# Patient Record
Sex: FEMALE | Race: WHITE
[De-identification: names, ages, dates, MRNs, and addresses within clinical notes are randomized per-mention and may not be internally consistent; named-entity substitution may affect disease eponyms.]

---

## 2022-12-25 ENCOUNTER — HOSPITAL ENCOUNTER (EMERGENCY)
Dept: HOSPITAL 97 - ER | Age: 67
Discharge: HOME | End: 2022-12-25
Payer: COMMERCIAL

## 2022-12-25 VITALS — TEMPERATURE: 99 F

## 2022-12-25 VITALS — DIASTOLIC BLOOD PRESSURE: 106 MMHG | SYSTOLIC BLOOD PRESSURE: 169 MMHG | OXYGEN SATURATION: 97 %

## 2022-12-25 DIAGNOSIS — M79.621: Primary | ICD-10-CM

## 2022-12-25 PROCEDURE — 96372 THER/PROPH/DIAG INJ SC/IM: CPT

## 2022-12-25 PROCEDURE — 99283 EMERGENCY DEPT VISIT LOW MDM: CPT

## 2022-12-25 NOTE — RAD REPORT
EXAM DESCRIPTION:  RAD - Humerus Right - 12/25/2022 7:39 pm

 

CLINICAL HISTORY:  PAIN

 

COMPARISON:  No comparisons

 

FINDINGS:  Diffuse osteopenia is seen. No acute fracture or dislocation seen.

## 2022-12-25 NOTE — EDPHYS
Physician Documentation                                                                           

 [facility]                                                                 

Name: Natalie Guerrero                                                                                

Age: 67 yrs                                                                                       

Sex: Female                                                                                       

: 1955                                                                                   

MRN: [id number]                                                                                   

Arrival Date: 2022                                                                          

Time: 18:40                                                                                       

Account#: [id number]                                                                            

Bed 13                                                                                            

Private MD:                                                                                       

ED Physician Goyo Cardenas                                                                      

HPI:                                                                                              

                                                                                             

18:53 This 67 yrs old Female presents to ER via Ambulatory with complaints of Arm Injury,     kb  

      Shoulder Injury.                                                                            

18:53 The patient or guardian complains of pain, that is acute. The complaints affect the     kb  

      right upper arm. Context: The problem was sustained at home, resulted from a fall.          

      Onset: The symptoms/episode began/occurred 8 day(s) ago. Treatment prior to arrival         

      includes: no previous treatment. Modifying factors: The symptoms are alleviated by          

      nothing. the symptoms are aggravated by movement. Associated signs and symptoms:            

      Pertinent positives: pain. Severity of symptoms: At their worst the symptoms were           

      moderate, in the emergency department the symptoms are unchanged. The patient has not       

      experienced similar symptoms in the past. The patient has not recently seen a               

      physician. Pt reports she fell onto right arm on  and still has pain to right          

      humerus area. States she tried acupuncture and massage without relief .                     

                                                                                                  

Historical:                                                                                       

- Allergies:                                                                                      

18:42 No Known Allergies;                                                                     hb  

- PMHx:                                                                                           

18:42 Asthma; hiatal hernia; Hypertensive disorder;                                           hb  

                                                                                                  

- Immunization history:: Adult Immunizations up to date.                                          

- Social history:: Smoking status: Patient denies any tobacco usage or history of.                

                                                                                                  

                                                                                                  

ROS:                                                                                              

18:53 Constitutional: Negative for fever, chills, and weight loss.                            kb  

18:53 MS/extremity: Positive for pain, of the right upper arm.                                    

18:53 All other systems are negative.                                                             

                                                                                                  

Exam:                                                                                             

18:53 Constitutional:  This is a well developed, well nourished patient who is awake, alert,  kb  

      and in no acute distress. Head/Face:  Normocephalic, atraumatic. ENT:  Moist Mucous         

      membranes Cardiovascular:  Regular rate and rhythm with a normal S1 and S2.  No             

      gallops, murmurs, or rubs.  No pulse deficits. Respiratory:  Respirations even and          

      unlabored. No increased work of breathing. Talking in full sentences Abdomen/GI:  Soft,     

      non-tender. No distention Skin:  Warm, dry with normal turgor.  Normal color. Neuro:        

      Awake and alert, GCS 15, oriented to person, place, time, and situation. Moves all          

      extremities. Normal gait. Psych:  Awake, alert, with orientation to person, place and       

      time.  Behavior, mood, and affect are within normal limits.                                 

18:53 Musculoskeletal/extremity: Extremities: grossly normal except: noted in the right upper     

      arm: pain, ROM: intact in all extremities, Circulation is intact in all extremities.        

      Sensation intact.                                                                           

                                                                                                  

Vital Signs:                                                                                      

18:42  / 101; Pulse 107; Resp 16; Temp 99(TE); Pulse Ox 100% ; Weight 63.5 kg; Height 5 hb  

      ft. 6 in. (167.64 cm); Pain 6/10;                                                           

19:09  / 99; Pulse 101; Resp 16; Pulse Ox 99% on R/A; Pain 7/10;                        jb4 

19:45  / 106; Pulse 96; Resp 16; Pulse Ox 97% on R/A;                                   jb4 

18:42 Body Mass Index 22.60 (63.50 kg, 167.64 cm)                                             hb  

                                                                                                  

MDM:                                                                                              

18:41 Patient medically screened.                                                             kb  

18:53 Data reviewed: vital signs, nurses notes. Data interpreted: Pulse oximetry: on room air kb  

      is 100 %. Interpretation: normal.                                                           

19:51 Counseling: I had a detailed discussion with the patient and/or guardian regarding: the kb  

      historical points, exam findings, and any diagnostic results supporting the                 

      discharge/admit diagnosis, radiology results, the need for outpatient follow up, a          

      orthopedic surgeon, to return to the emergency department if symptoms worsen or persist     

      or if there are any questions or concerns that arise at home.                               

                                                                                                  

                                                                                             

18:43 Order name: Humerus Right XRAY                                                            

                                                                                             

18:55 Order name: Vital Signs; Complete Time: 19:10                                           kb  

                                                                                                  

Administered Medications:                                                                         

19:21 Drug: Ketorolac 30 mg Route: IM; Site: right gluteus;                                   jb4 

20:09 Follow up: Response: No adverse reaction; Pain is decreased                             jb4 

                                                                                                  

                                                                                                  

Disposition Summary:                                                                              

22 19:59                                                                                    

Discharge Ordered                                                                                 

      Location: Home                                                                            

      Condition: Stable                                                                       kb  

      Diagnosis                                                                                   

        - Pain in right upper arm                                                             kb  

      Followup:                                                                               kb  

        - With: Emergency Department                                                               

        - When: As needed                                                                          

        - Reason: Worsening of condition                                                           

      Followup:                                                                               kb  

        - With: Private Physician                                                                  

        - When: 2 - 3 days                                                                         

        - Reason: Recheck today's complaints, Continuance of care, Re-evaluation by your           

      physician                                                                                   

      Discharge Instructions:                                                                     

        - Discharge Summary Sheet                                                             kb  

        - Musculoskeletal Pain                                                                kb  

      Forms:                                                                                      

        - Medication Reconciliation Form                                                      kb  

        - Thank You Letter                                                                    kb  

        - Antibiotic Education                                                                kb  

        - Prescription Opioid Use                                                             kb  

      Prescriptions:                                                                              

        - Diclofenac Sodium 75 mg Oral tablet,delayed release (DR/EC)                              

            - take 1 tablet by ORAL route 2 times per day As needed; 30 tablet; Refills: 0,   kb  

      Product Selection Permitted                                                                 

        - orphenadrine citrate 100 mg Oral Tablet Sustained Release                                

            - take 1 tablet by ORAL route 2 times per day As needed; 20 tablet; Refills: 0,   kb  

      Product Selection Permitted                                                                 

Signatures:                                                                                       

Dispatcher [employer]                           EDMS                                                 

Delia Becerra, FNP-C                 FNDISHA-Carolyn Cox RN                     RN                                                      

Aric Guerra RN                       RN   jb4                                                  

                                                                                                  

**************************************************************************************************

## 2022-12-25 NOTE — ER
Nurse's Notes                                                                                     

 [facility]                                                                 

Name: Natalie Guerrero                                                                                

Age: 67 yrs                                                                                       

Sex: Female                                                                                       

: 1955                                                                                   

MRN: [id number]                                                                                   

Arrival Date: 2022                                                                          

Time: 18:40                                                                                       

Account#: [id number]                                                                            

Bed 13                                                                                            

Private MD:                                                                                       

Diagnosis: Pain in right upper arm                                                                

                                                                                                  

Presentation:                                                                                     

                                                                                             

18:42 Chief complaint: Right shoulder pain that radiates to right arm after fall from           

      standing 1 week ago. Coronavirus screen: At this time, the client does not indicate any     

      symptoms associated with coronavirus-19. Ebola Screen: No symptoms or risks identified      

      at this time. Initial Sepsis Screen: Does the patient meet any 2 criteria? No.              

      Patient's initial sepsis screen is negative. Does the patient have a suspected source       

      of infection? No. Patient's initial sepsis screen is negative. Risk Assessment: Do you      

      want to hurt yourself or someone else? Patient reports no desire to harm self or            

      others. Onset of symptoms was 2022.                                            

18:42 Method Of Arrival: Ambulatory                                                           hb  

18:42 Acuity: HADLEY 4                                                                           hb  

                                                                                                  

Historical:                                                                                       

- Allergies:                                                                                      

18:42 No Known Allergies;                                                                     hb  

- PMHx:                                                                                           

18:42 Asthma; hiatal hernia; Hypertensive disorder;                                           hb  

                                                                                                  

- Immunization history:: Adult Immunizations up to date.                                          

- Social history:: Smoking status: Patient denies any tobacco usage or history of.                

                                                                                                  

                                                                                                  

Screenin:15 [facility] ED Fall Risk Assessment (Adult) History of falling in the last 3 months,       jb4 

      including since admission No falls in past 3 months (0 pts) Confusion or Disorientation     

      No (0 pts) Intoxicated or Sedated No (0 pts) Impaired Gait No (0 pts) Mobility Assist       

      Device Used No (0 pt) Altered Elimination No (0 pt) Score/Fall Risk Level 0 - 2 = Low       

      Risk Oriented to surroundings, Maintained a safe environment. Abuse screen: Denies          

      threats or abuse. Nutritional screening: No deficits noted. Tuberculosis screening: No      

      symptoms or risk factors identified.                                                        

                                                                                                  

Assessment:                                                                                       

19:14 General: Appears in no apparent distress. comfortable, Behavior is calm, cooperative,   jb4 

      appropriate for age. Pain: Complains of pain in right arm Pain radiates to right            

      scapular area Pain currently is 7 out of 10 on a pain scale. Quality of pain is             

      described as throbbing. Neuro: Level of Consciousness is awake, alert, obeys commands,      

      Oriented to person, place, time, situation. Cardiovascular: Patient's skin is warm and      

      dry. Respiratory: Airway is patent Respiratory effort is even, unlabored, Respiratory       

      pattern is regular, symmetrical. GI: No signs and/or symptoms were reported involving       

      the gastrointestinal system. : No signs and/or symptoms were reported regarding the       

      genitourinary system. EENT: No signs and/or symptoms were reported regarding the EENT       

      system. Derm: Skin is intact, Skin is pink, warm \T\ dry. Musculoskeletal: Circulation,     

      motion, and sensation intact. Range of motion: intact in all extremities.                   

20:09 Reassessment: Patient appears in no apparent distress at this time. Patient and/or      jb4 

      family updated on plan of care and expected duration. Pain level reassessed. Patient is     

      alert, oriented x 3, equal unlabored respirations, skin warm/dry/pink.                      

                                                                                                  

Vital Signs:                                                                                      

18:42  / 101; Pulse 107; Resp 16; Temp 99(TE); Pulse Ox 100% ; Weight 63.5 kg; Height 5 hb  

      ft. 6 in. (167.64 cm); Pain 6/10;                                                           

19:09  / 99; Pulse 101; Resp 16; Pulse Ox 99% on R/A; Pain 7/10;                        jb4 

19:45  / 106; Pulse 96; Resp 16; Pulse Ox 97% on R/A;                                   jb4 

18:42 Body Mass Index 22.60 (63.50 kg, 167.64 cm)                                             hb  

                                                                                                  

ED Course:                                                                                        

18:40 Patient arrived in ED.                                                                  mr  

18:41 [facility]C [facility]                                                        kb  

18:41 Goyo Cardenas MD is Attending Physician.                                             kb  

18:44 Triage completed.                                                                       hb  

18:44 Arm band placed on.                                                                     hb  

19:04 Aric Guerra, RN is Primary Nurse.                                                     jb4 

19:15 Patient has correct armband on for positive identification. Placed in gown. Bed in low  jb4 

      position. Call light in reach. Side rails up X 1. Client placed on continuous cardiac       

      and pulse oximetry monitoring. NIBP monitoring applied.                                     

19:40 Humerus Right XRAY In Process Unspecified.                                              EDMS

20:09 No provider procedures requiring assistance completed. Patient did not have IV access   jb4 

      during this emergency room visit.                                                           

                                                                                                  

Administered Medications:                                                                         

19:21 Drug: Ketorolac 30 mg Route: IM; Site: right gluteus;                                   jb4 

20:09 Follow up: Response: No adverse reaction; Pain is decreased                             jb4 

                                                                                                  

                                                                                                  

Outcome:                                                                                          

19:59 Discharge ordered by MD. julien  

20:09 Discharged to home ambulatory.                                                          jb4 

20:09 Condition: stable                                                                           

20:09 Discharge instructions given to patient, Instructed on discharge instructions, follow       

      up and referral plans. no drinking with medication, no driving heavy equipment,             

      medication usage, Demonstrated understanding of instructions, follow-up care,               

      medications, Prescriptions given X 2.                                                       

20:10 Patient left the ED.                                                                    jb4 

                                                                                                  

Signatures:                                                                                       

Dispatcher [employer]                           EDMS                                                 

Delia Becerra, SHIMAPPadminiC                 FNP-Melissa Baca Heather, RN                     RN   Aric Servin RN                       RN   jb4                                                  

                                                                                                  

**************************************************************************************************

## 2022-12-25 NOTE — XMS REPORT
Continuity of Care Document

                          Created on:2022



Patient:BILL MICHEL

Sex:Female

:1955

External Reference #:[id number]





Demographics







                          Address                   [address] [place] [address]

 

                          Home Phone                [phone]

 

                          Mobile Phone              [phone]

 

                          Email Address             [email or web address]

 

                          Preferred Language        English

 

                          Marital Status            Unknown

 

                          Quaker Affiliation     Unknown

 

                          Race                      Unknown

 

                          Additional Race(s)         or [place] Na

tive



                                                    Unavailable

 

                          Ethnic Group              Unknown









Author







                          Organization              [facility]

t

 

                          Address                   [address]



                                                    [place] [address]

 

                          Phone                     [phone]









Support







                Name            Relationship    Address         Phone

 

                Debora Michel Extended family member [address] [facility]   [phone]

[phone]



                                                [place] [address] 

 

                GUY MICHEL               [address] [facility]   [phone]



                                                [place] [address] 

 

                DEBORA MICHEL               -               [phone]

[phone]



                                                [place] [address] 

 

                ROBERTO MICHEL                  Unavailable     [phone]

 

                NOT OBTAINED    Unavailable     Unavailable     Unavailable

 

                MICHELWILMA VARMA                 [address] [facility]   [phone]



                                                [place] [address] 









Care Team Providers







                    Name                Role                Phone

 

                    [facility] [phone]

 

                    TAY GANNON Attending Clinician Unavailable

 

                    GERARD BELL Attending Clinician Unavailable

 

                    MAUREEN ACOSTA Attending Clinician Unavailable

 

                    MAUREEN ACOSTA Attending Clinician Unavailable

 

                    Tay Gannon Attending Clinician [phone]

[phone]

 

                    TAY GANNON Attending Clinician Unavailable

 

                    Paula Desir Attending Clinician [phone]

 

                    PAULA DESIR Attending Clinician Unavailable

 

                    GERARD BELL Attending Clinician Unavailable

 

                    Gerard Bell Attending Clinician [phone]

 

                    Maureen Acosta Attending Clinician [phone]

 

                    Toney Martins Attending Clinician [phone]

 

                    TONEY MARTINS Attending Clinician Unavailable

 

                    PIA FOY Attending Clinician Unavailable

 

                    Pia Foy Attending Clinician [phone]

 

                    Clover Nayak Attending Clinician [phone]

 

                    CLOVER NAYAK Attending Clinician Unavailable

 

                    PIA FOY Admitting Clinician Unavailable

 

                    Pia Foy Admitting Clinician [phone]









Payers







           Payer Name Policy Type Policy Number Effective Date Expiration Date S

chika

 

           [employer] PPO AND            [id number]  2008 00:00:00 



           OUT OF STATE                       00:00:00              







Problems







       Condition Condition Condition Status Onset  Resolution Last   Treating Co

mments 

Source



       Name   Details Category        Date   Date   Treatment Clinician        



                                                 Date                 

 

       CHEST   CHEST Diagnosis Active 2022-0        2022-08-15               Mem

oria



       PAIN,  PAIN,                8-10          08:53:00               l



       UNSPECIFIE UNSPECIFIE               00:00:                             Janak becerra



       D,     D,                   00                                 



       SHORTNESS SHORTNESS                                                  



       OF BR  OF BR                                                   



              Active                                                  



              08/10/2022                                                  



                                                                    



              [facility]                                                  



              [facility]                                                  

 

       4 WK FU   4 WK FU Diagnosis Active 2022-0        2022-10-10              

 [facility]



              Active               8-10          12:03:00               l



              08/10/2022               00:00:                             Pedro Pablo dasilva



              [address]                                                  

 

       REF BY   REF BY Diagnosis Active 2022-0        2022-08-10             

  Karen [address]O DR.                  6-15          15:08:00               l



       DAMIR GALO               00:00:                             [facility]



       CARDIAC NAYAK/                00                                 



       CLEARAN CARDIAC                                                  



              CLEARAN                                                  



              Active                                                  



              06/15/2022                                                  



              [facility]                                                  

 

       INTERSTITI  INTERSTIT Diagnosis Active 2022          

     Karen



       AL     IAL                  5-18          14:09:00               l



       PULMONARY PULMONARY               00:00:                             Herm

nydia



       DISEASE DISEASE               00                                 



              Active                                                  



              2022                                                  



              [facility]                                                  

 

       GERD K44.9  GERD  Diagnosis Active 2022              

 [facility]



       K20.91 K44.9                4-04          07:38:00               l



              K20.91               00:00:                             [place]



              Active               00                                 



              2022                                                  



               [facility]                                                  

 

       UNK     UNK   Diagnosis Active 2022               Mem

oria



              Active                         08:07:00               l



              2022               00:00:                             Pedro Pablo dasilva



              [address]                                                  

 

       ESOPHAGEAL  ESOPHAGEA Diagnosis Active 2022-0        2022          

     [facility]



       STRICTURE L                    1-06          14:29:00               l



              STRICTURE               00:00:                             Unruly



              Active               00                                 



              2022                                                  



              [facility]                                                  

 

       PHONE   PHONE Diagnosis Active 2022               Mem

oria



       VISIT  VISIT                          13:52:00               l



              Active               00:00:                             Unruly



              2022               00                                 



               [facility]                                                  

 

       ULCERATIVE  ULCERATIV Diagnosis Active 2021          

     [facility]



       ESOPHAGITI E                              12:33:00               l



       S, HIATAL ESOPHAGITI               00:00:                             Her

crow



       HERNIA, H S, HIATAL               00                                 



              HERNIA, H                                                  



              Active                                                  



              10/22/2021                                                  



                                                                    



              [facility]                                                  



              [facility]                                                  

 

       ULCER OF  ULCER OF Diagnosis Active 2021-0        2021-10-04             

  [facility]



       ESOPHAGUS ESOPHAGUS                         16:35:00               l



       WITHOUT WITHOUT               00:00:                             Unruly



       BLEEDING, BLEEDING,               00                                 



       ESS    ESS Active                                                  



              2021                                                  



                                                                    



              [facility]                                                  



              [facility]                                                  

 

       ABDOMINAL  ABDOMINAL Diagnosis Active 2021           

    [facility]



       PAIN   PAIN                           11:34:00               l



       DISPHAGIA DISPHAGIA               00:00:                             Herm

nydia



              Active               00                                 



              2021                                                  



                                                                    



              [facility]                                                  



              [facility]                                                  

 

       GERD    GERD  Diagnosis Active 2021               Mem

oria



              Active                         11:45:00               l



              2021               00:00:                             Pedro Pablo dasilva



               [facility]               00                                 



              [facility]                                                  

 

       SENT BY  SENT BY Diagnosis Active 2021               

[facility]



       PCP FOR PCP FOR                         16:42:00               l



       BLOODY BLOODY               00:00:                             [place]



       STOOL  STOOL                00                                 



              Active                                                  



              2021                                                  



               [facility]                                                  



              [facility]                                                  

 

       UPPER GO  UPPER GO Diagnosis Active 2021             

  [facility]



       BLEED; BLEED;                         11:32:00               l



       ANEMIA; ANEMIA;               00:00:                             [place]



       PEPTIC PEPTIC               00                                 



       ULCER DIS ULCER DIS                                                  



              Active                                                  



              2021                                                  



                                                                    



              [facility]                                                  



              [facility]                                                  

 

       R92.2   R92.2 Diagnosis Active 2021               Mem

oria



       INCONCLUSI INCONCLUSI                         11:59:00               

l



       VE     VE                   00:00:                             [facility]



       MAMMOGRAM MAMMOGRAM               00                                 



              Active                                                  



              2021                                                  



                                                                    



              [facility]                                                  



              [facility]                                                  

 

       Z12.31  Z12.31 Diagnosis Active 2021               Me

moria



       ROUTINE, ROUTINE,                         10:52:00               l



       M81.0  M81.0                00:00:                             [place]



       OSTEOPOROS OSTEOPOROS               00                                 



       IS     IS Active                                                  



              2021                                                  



               [facility]                                                  

 

       Z12.31  Z12.31 Diagnosis Active 2021               Me

[place]



       ROUTINE, ROUTINE,                         10:25:00               l



       AGE-RELATE AGE-RELATE               00:00:                             He

hernan



       D      D                    00                                 



       OSTEOPOROS OSTEOPOROS                                                  



       I      I Active                                                  



              2021                                                  



                                                                    



              [facility]                                                  



              [facility]                                                  

 

       R05 COUGH  R05 COUGH Diagnosis Active 2018-1        2018-10-02           

    [facility]



               Active               0          10:19:00               l



              10/01/2018               00:00:                             Pedro Pablo dasilva



              [facility]               00                                 



              [facility]                                                  

 

       R92.8   R92.8 Diagnosis Active 2017-1        2017-10-10               Mem

oria



       OTHER  OTHER                0          13:58:00               l



       ABNORMAL ABNORMAL               00:00:                             Pedro Pablo dasilva



       AND    AND                  00                                 



       INCONCLUSI INCONCLUSI                                                  



       VE F   VE F                                                    



              Active                                                  



              10/06/2017                                                  



              [facility]                                                  

 

       N63     N63   Diagnosis Active 2017               Mem

oria



              Active                         10:11:00               l



              2017               00:00:                             Pedro Pablo dasilva



                                 00                                 



              [facility]                                                  



              [facility]                                                  

 

       LUMP IN  LUMP IN Diagnosis Active 2017               

[facility]



       BREAST, BREAST,                         15:38:00               l



       OTHER  OTHER                00:00:                             [facility]



       OSTEOPOROS OSTEOPOROS               00                                 



       IS     IS Active                                                  



              2017                                                  



               [facility]                                                  

 

       Diaphragma  Diaphragm Problem                      2019            

   [facility]



       tic hernia atic                               14:11:30               l



       without hernia                                                  Unruly



       obstructio without                                                  



       n or   obstructio                                                  



       gangrene n or                                                    



              gangrene                                                  



              2019                                                  



              [facility]                                                  

 

       Anxiety  Anxiety Problem Active               2022               Me

moria



       (finding) (finding)                             00:07:16               l



              Active                                                  [facility]



              Problem                                                  



              2022                                                  



               Medical                                                  



              [facility]                                                  

 

       Asthma  Asthma Problem Active               2022               [facility]

jairo



       (disorder) (disorder)                             00:07:16               

l



              Active                                                  [facility]



              Problem                                                  



              2022                                                  



               Medical                                                  



              [facility]                                                  

 

       Gastroesop  Gastroeso Problem Active               2022            

   [facility]



       hageal phageal                             00:07:16               l



       reflux reflux                                                  Unruly



       disease disease                                                  



       (disorder) (disorder)                                                  



               Active                                                  



              Problem                                                  



              2022                                                  



               Medical                                                  



              [facility]                                                  



              [facility]                                                  

 

       Hypertensi  Hypertens Problem Active               2022            

   [facility]



       ve     derik                                00:07:16               l



       disorder, disorder,                                                  Herm

nydia



       systemic systemic                                                  



       arterial arterial                                                  



       (disorder) (disorder)                                                  



              Active                                                  



              Problem                                                  



              2022                                                  



               Medical                                                  



              [facility]                                                  

 

       Ulcerative  Ulcerativ Problem Active               2022            

   [facility]



       esophagiti e                                  00:07:16               l



       s      esophagiti                                                  Pedro Pablo

n



       (disorder) s                                                       



              (disorder)                                                  



              Active                                                  



              Problem                                                  



              2022                                                  



               Medical                                                  



              [facility]                                                  

 

       Anemia  Anemia Problem Active               2022               [facility]

[place]



       (disorder) (disorder)                             00:07:16               

l



               Active                                                  [facility]



              Problem                                                  



              2022                                                  



               Medical                                                  



              [facility]                                                  

 

       ULCER OF  ULCER OF Diagnosis Active               2021             

  [facility]



       ESOPHAGUS ESOPHAGUS                             12:33:00               l



       WITHOUT WITHOUT                                                  [place]



       BLEEDING BLEEDING                                                  



              Active [facility]                                                  

 

       ESSENTIAL  ESSENTIAL Diagnosis Active               2021           

    [facility]



       (PRIMARY) (PRIMARY)                             12:33:00               l



       HYPERTENSI HYPERTENSI                                                  He

rmann



       ON     ON Active                                                  



              [facility]                                                  

 

       GASTROINTE  GASTROINT Diagnosis Active               2021          

     [facility]



       STINAL ESTINAL                             11:32:00               l



       HEMORRHAGE HEMORRHAGE                                                  He

rmann



       ,      ,                                                       



       UNSPECIFIE UNSPECIFIE                                                  



       D      D Active                                                  



              [facility]                                                  

 

       ANEMIA,  ANEMIA, Diagnosis Active               2021               

[facility]



       UNSPECIFIE UNSPECIFIE                             11:32:00               

l



       D      D Active                                                  [place]



              [facility]                                                  

 

       PEPTIC  PEPTIC Diagnosis Active               2021               Me

moria



       [facility], SITE UL, SITE                             11:32:00               l



       UNSP, UNSP UNSP, UNSP                                                  He

rmann



       AS AC OR AS AC OR                                                  



       CHR    CHR Active                                                  



              [facility]                                                  

 

       GASTRO-ESO  GASTRO-ES Diagnosis Active               2021          

     [facility]



       PHAGEAL OPHAGEAL                             11:45:00               l



       REFLUX REFLUX                                                  [facility]



       DISEASE DISEASE                                                  



       WITHOUT WITHOUT                                                  



              Active  [facility]                                                  

 

       AGE-RELATE  AGE-RELAT Diagnosis Active               2021          

     [facility]



       D      ED                                 10:52:00               l



       OSTEOPOROS OSTEOPOROS                                                  He

rmann



       IS W/O IS W/O                                                  



       CURRENT CURRENT                                                  



       PAT    PAT Active                                                  



               [facility]                                                  

 

       ENCNTR   ENCNTR Diagnosis Active               2021               M

emoria



       SCREEN SCREEN                             10:52:00               l



       MAMMOGRAM MAMMOGRAM                                                  [facility]erm

nydia



       FOR    FOR                                                     



       MALIGNANT MALIGNANT                                                  



       NE     NE Active                                                  



              [facility]                                                  



              [facility]                                                  

 

       INCONCLUSI  INCONCLUS Diagnosis Active               2021          

     [facility]



       VE     DERIK                                11:59:00               l



       MAMMOGRAM MAMMOGRAM                                                  [facility]

nydia



              Active                                                    



              [facility]                                                  



              [facility]                                                  

 

       DIAPHRAGMA  DIAPHRAGM Diagnosis Active               2021          

     [facility]



       TIC HERNIA ATIC                               12:33:00               l



       WITHOUT HERNIA                                                  [facility]



       OBSTRUCTIO WITHOUT                                                  



       N      OBSTRUCTIO                                                  



              N Active                                                  



               [facility]                                                  



              [facility]                                                  

 

       ENCNTR FOR  ENCNTR Diagnosis Active               2022-08-10             

  [facility]



       GENERAL FOR                                15:08:00               l



       ADULT  GENERAL                                                  [place]



       MEDICAL ADULT                                                   



       EXAM W/ MEDICAL                                                  



              EXAM W/                                                  



              Active  [facility]                                                  

 

       INTERSTITI        Diagnosis Active               2022              

 [facility]



       AL     INTERSTITI                             14:09:00               l



       PULMONARY AL                                                      [facility]



       DISEASE, PULMONARY                                                  



       UNSPECIF DISEASE,                                                  



              UNSPECIF                                                  



              Active                                                    



              [facility]                                                  



              [facility]                                                  

 

       UNSPECIFIE  UNSPECIFI Diagnosis Active               2022          

     [facility]



       D ASTHMA, ED ASTHMA,                             14:09:00               l



       UNCOMPLICA UNCOMPLICA                                                  He

rmann



       MARIA LUZ    MARIA LUZ                                                     



              Active [facility]                                                  

 

       UNSPECIFIE  UNSPECIFI Diagnosis Active               2017          

     [facility]



       D LUMP IN ED LUMP IN                             15:38:00               l



       BREAST BREAST                                                  [facility]



              Active                                                    



              [facility]                                                  



              [facility]                                                  

 

       OTHER   OTHER Diagnosis Active               2017               Mem

oria



       OSTEOPOROS OSTEOPOROS                             15:38:00               

l



       IS WITHOUT IS WITHOUT                                                  He

rmann



       CURRENT CURRENT                                                  



       PATHO  PATHO                                                   



              Active                                                    



              [facility]                                                  



              [facility]                                                  

 

       OTH ABN  OTH ABN Diagnosis Active               2017-10-10               

[facility]



       AND    AND                                13:58:00               l



       INCONCLUSI INCONCLUSI                                                  He

rmann



       VE     VE                                                      



       FINDINGS FINDINGS                                                  



       ON DX  ON DX                                                   



              Active [facility]                                                  

 

       COUGH   COUGH Diagnosis Active               2018-10-02               Mem

oria



              Active                               10:19:00               l



              [facility]                                                  [facility]                                                  

 

       CHEST    CHEST Diagnosis Active               2022-08-15               Me

moria



       PAIN,  PAIN,                              08:53:00               l



       UNSPECIFIE UNSPECIFIE                                                  He

rmann



       D      D Active                                                  



              [facility]                                                  

 

       SHORTNESS  SHORTNESS Diagnosis Active               2022-08-15           

    [facility]



       OF BREATH OF BREATH                             08:53:00               l



              Active                                                    Pedro Pablo

n



              [facility]                                                  



              [facility]                                                  







History of Past Illness







       Condition Condition Condition Status Onset  Resolution Last   Treating Co

mments 

Source



       Name   Details Category        Date   Date   Treatment Clinician        



                                                 Date                 

 

       Encounter  Encounter Problem        2022         

      [facility]



       for    for                  8-10   00:43:14 00:43:14               l



       preprocedu preprocedu               20:28:                             He

rmann



       ral    ral                  00                                 



       cardiovasc cardiovasc                                                  



       ular   ular                                                    



       examinatio examinatio                                                  



       n      n                                                       



              08/10/2022                                                  



              2022                                                  



              [facility]                                                  

 

       Shortness  Shortness Problem        2022         

      [facility]



       of breath of breath               8-10   00:43:14 00:43:14               

l



              08/10/2022               20:28:                             Pedro Pablo dasilva



              2022               00                                 



              [facility]                                                  

 

       Chest   Chest Problem        2022               M

emoripaula



       pain,  pain,                8-10   00:43:14 00:43:14               l



       unspecifie unspecifie               20:28:                             He

rmann



       d      d                    00                                 



              08/10/2022                                                  



              2022                                                  



              [facility]                                                  

 

       Gastro-eso  Gastro-es Problem        2022        

       [facility]



       phageal ophageal                  00:08:27 00:08:27               l



       reflux reflux               18:48:                             [facility]



       disease disease               00                                 



       without without                                                  



       esophagiti esophagiti                                                  



       s      s                                                       



              2022                                                  



               [facility]                                                  

 

       Cough   Cough Problem        -2019               M

emoria



              10/06/2018               0-06   14:11:30 14:11:30               l



              2019               03:52:                             Pedro Pablo dasilva



              [address]                                                  







Allergies, Adverse Reactions, Alerts

This patient has no known allergies or adverse reactions.



Social History







           Social Habit Start Date Stop Date  Quantity   Comments   Source

 

           Social History 2022                       [facility]

rebel



                      18:25:55   18:25:55                         

 

           Sex Assigned At 1955                       [facility]



           Birth      00:00:00   00:00:00                         









                Smoking Status  Start Date      Stop Date       Source

 

                Tobacco smoking consumption unknown                             

    [facility]







Medications







       Ordered Filled Start  Stop   Current Ordering Indication Dosage Frequency

 Signature

                    Comments            Components          Source



     Medication Medication Date Date Medication? Clinician                (SIG) 

          



     Name Name                                                   

 

     Hydralazine            No                       10 mg,           [facility]

jairo



                                              Route:           l



               19:11:                               IVP,           [facility]



               00                                 Q20Min,           



                                                  Dosing           



                                                  Weight           



                                                  65.2, kg,           



                                                  PRN            



                                                  Elevated           



                                                  BP, Start           



                                                  date:           



                                                  22           



                                                  13:11:00           



                                                  CST,           



                                                  Duration:           



                                                  2 doses or           



                                                  times,           



                                                  Stop date:           



                                                  Limited #           



                                                  of times           

 

     Labetalol      -0      No                       10 mg,           [facility]

a



               2-25                               Route:           l



               19:11:                               IVP,           [place]



               00                                 Q5Min,           



                                                  Dosing           



                                                  Weight           



                                                  65.2, kg,           



                                                  PRN            



                                                  Elevated           



                                                  BP, Start           



                                                  date:           



                                                  22           



                                                  13:11:00           



                                                  CST,           



                                                  Duration:           



                                                  5 doses or           



                                                  times,           



                                                  Stop date:           



                                                  Limited #           



                                                  of times           

 

     Fentanyl      -0      No                       25             [facility]



               2-25                               microgram,           l



               19:11:                               Route:           [facility]



               00                                 IVP,           



                                                  Q15Min,           



                                                  Dosing           



                                                  Weight           



                                                  65.2, kg,           



                                                  PRN Pain           



                                                  Score 4-6,           



                                                  Priority:           



                                                  Routine,           



                                                  Start           



                                                  date:           



                                                  22           



                                                  13:11:00           



                                                  CST,           



                                                  Duration:           



                                                  4 doses or           



                                                  times,           



                                                  Stop date:           



                                                  Limited #           



                                                  of times           

 

     Hydromorpho      -0      No                       0.5 mg,           Mem

oria



     ne        2-25                               Route:           l



               19:11:                               IVP,           [place]



               00                                 Q20Min,           



                                                  Dosing           



                                                  Weight           



                                                  65.2, kg,           



                                                  PRN Pain           



                                                  Score           



                                                  7-10,           



                                                  Start           



                                                  date:           



                                                  22           



                                                  13:11:00           



                                                  CST,           



                                                  Duration:           



                                                  4 doses or           



                                                  times,           



                                                  Stop date:           



                                                  Limited #           



                                                  of times           

 

     Flumazenil      -0      No                       0.2 mg,           [facility]

jairo



                                              Route:           l



               19:11:                               IVP, PRN,           [place]



               00                                 Dosing           



                                                  Weight           



                                                  65.2, kg,           



                                                  PRN            



                                                  Benzodiaze           



                                                  pine           



                                                  Reversal,           



                                                  Initial           



                                                  dose,           



                                                  Start           



                                                  date:           



                                                  22           



                                                  13:11:00           



                                                  CST,           



                                                  Duration:           



                                                  30 day,           



                                                  Stop date:           



                                                  22           



                                                  14:10:00           



                                                  CDT            

 

     Naloxone      -0      No                       0.4 mg,           [facility]

a



                                              Route:           l



               19:11:                               IVP,           [place]



               00                                 Q2MIN,           



                                                  Dosing           



                                                  Weight           



                                                  65.2, kg,           



                                                  PRN            



                                                  Narcotic           



                                                  Reversal,           



                                                  Start           



                                                  date:           



                                                  22           



                                                  13:11:00           



                                                  CST,           



                                                  Duration:           



                                                  8 doses or           



                                                  times,           



                                                  Stop date:           



                                                  Limited #           



                                                  of times           

 

     Albuterol      -0      No                       2.49 mg,           [facility]

jairo



     0.83 MG/ML                                     Route:           l



     Inhalant      19:11:                               NEB,           [place]



     Solution      00                                 Q20Min,           



                                                  Dosing           



                                                  Weight           



                                                  65.2, kg,           



                                                  PRN            



                                                  Wheezing,           



                                                  Start           



                                                  date:           



                                                  22           



                                                  13:11:00           



                                                  CST,           



                                                  Duration:           



                                                  30 day,           



                                                  Stop date:           



                                                  22           



                                                  14:10:00           



                                                  CDT            

 

     Ondansetron            No                       4 mg,           [facility]

ia



                                              Route:           l



               19:11:                               IVP, ONCE,                                            Dosing           



                                                  Weight           



                                                  65.2, kg,           



                                                  PRN Nausea           



                                                  &              



                                                  Vomiting,           



                                                  Start           



                                                  date:           



                                                  22           



                                                  13:11:00           



                                                  CST            

 

     Promethazin            No                       6.25 mg,           Me

moria



     e                                        Route:           l



               19:11:                               IVPB,                                            ONCE,           



                                                  Dosing           



                                                  Weight           



                                                  65.2, kg,           



                                                  PRN Nausea           



                                                  &              



                                                  Vomiting,           



                                                  Start           



                                                  date:           



                                                  22           



                                                  13:11:00           



                                                  CST            

 

     Simethicone            No                       40 mg,           [facility]

[facility]



                                              Route: PO,           l



               19:11:                               Drug form:           [facility]



                                                DROP,           



                                                  ONCE,           



                                                  Dosing           



                                                  Weight           



                                                  65.2, kg,           



                                                  PRN Gas,           



                                                  Start           



                                                  date:           



                                                  22           



                                                  13:11:00           



                                                  CST            

 

     Benzocaine            No                       1 lozenge,           M

[facility]



     15 MG /                                     Route:           l



     Menthol 3.6      19:11:                               MUCOUS           [facility]

[facility]



     MG Lozenge      00                                 [facility], Drug           



     [Cepacol                                         Form: CELE,           



     Sore Throat                                         Dosing           



     Pain Relief                                         Weight           



     15/3.6]                                         65.2, kg,           



                                                  Q2H, PRN           



                                                  Sore           



                                                  Throat,           



                                                  Start           



                                                  date:           



                                                  22           



                                                  13:11:00           



                                                  CST,           



                                                  Duration:           



                                                  30 day,           



                                                  Stop date:           



                                                  22           



                                                  13:10:00           



                                                  CDT            

 

     lidocaine            No                       Route: IV,           Me

[facility]



     (ANES)      -                               Drug form:           l



               18:49:                               INJ, ONCE,                                            Stop date:           



                                                  22           



                                                  12:49:00           



                                                  CST            

 

     propofol            No                       Route: IV,           [facility]

[facility]



     (ANES)      2-25                               Drug form:           l



               18:49:                               INJ, ONCE,                                            Stop date:           



                                                  22           



                                                  12:49:00           



                                                  CST            

 

     succinylcho            No                       Route: IV,           

[facility]



     line (ANES)      2-                               Drug form:           l



               18:49:                               INJ, ONCE,                                            Stop date:           



                                                  22           



                                                  12:49:00           



                                                  CST            

 

     fentaNYL            No                       Route: IV,           Mem

[facility]



     (ANES)      2-25                               Drug form:           l



               18:49:                               INJ, ONCE,                                            Stop date:           



                                                  22           



                                                  12:49:00           



                                                  CST            

 

     ondansetron            No                       Route: IV,           

[facility]



     (ANES)      -                               Drug form:           l



               18:49:                               INJ, ONCE,                                            Stop date:           



                                                  22           



                                                  12:49:00           



                                                  CST            

 

     dexamethaso            No                       Route: IV,           

[facility]



     ne (ANES)                                     Drug form:           l



               18:49:                               INJ, ONCE,                                            Stop date:           



                                                  22           



                                                  12:49:00           



                                                  CST            

 

     ePHEDrine            No                       Route: IV,           Me

moria



     (ANES)                                     Drug form:           l



               18:49:                               INJ, ONCE,                                            Stop date:           



                                                  22           



                                                  12:49:00           



                                                  CST            

 

     phenylephri            No                       Route: IV,           

[facility]



     ne (ANES)                                     Drug form:           l



               18:46:                               INJ, ONCE,                                            Stop date:           



                                                  22           



                                                  12:46:00           



                                                  CST            

 

     Isolyte S            No                       Route: IV,           Me

[facility]



     PH 7.4                                     Total           l



     (ANES) 1000      17:55:                               Volume:           Her

crow



     mL        00                                 1,000,           



                                                  Start           



                                                  date:           



                                                  22           



                                                  11:55:00           



                                                  CST, Stop           



                                                  date:           



                                                  22           



                                                  12:55:00           



                                                  CST            

 

     Labetalol      2021      No                       10 mg,           [facility]

a



               2-                               Route: IV,           l



               20:47:                               ONCE,                                            Dosing           



                                                  Weight           



                                                  66.818,           



                                                  kg, Start           



                                                  date:           



                                                  21           



                                                  14:47:00           



                                                  CST, Stop           



                                                  date:           



                                                  21           



                                                  14:47:00           



                                                  CST            

 

     Fentanyl      2021      No                       Notes:           [facility]



               2-06                               (Same as:           l



               20:44:                               Sublimaze)                                            Preservati           



                                                  ve free.           

 

     Hydromorpho      2021      No                       Notes:           [facility]

[facility]



     ne        2-06                               Same as           l



               20:44:                               Dilaudid                                                           

 

     Flumazenil      2021      No                       Notes:           [facility]

ia



               2-06                               (Same as:           l



               20:44:                               Romazicon)                                                           

 

     Naloxone      2021      No                       Notes:           [facility]



               2-06                               Same as           l



               20:44:                               Narcan                                                           

 

     Meperidine      2021      No                       Notes:           [facility]

ia



               2-06                               (Same as:           l



               20:44:                               Demerol)                                            "Use           



                                                  Precaution           



                                                  in             



                                                  Elderly,           



                                                  Seizure           



                                                  disorders,           



                                                  and Renal           



                                                  impairment           



                                                  "              

 

     Ondansetron      2021      No                       Notes:           [facility]

[facility]



                                              (Same as:           l



               20:44:                               Zofran)           [facility]



               00                                 ***            



                                                  MEDICATION           



                                                  WASTE ***           



                                                  Product           



                                                  Size: 4 mg           



                                                  Product           



                                                  Wasted:           



                                                  ___ mg           

 

     Acetaminoph      2021      No                       Notes: Max           

[facility]



     en                                       acetaminop           l



               20:44:                               hen 4000           [facility]



               00                                 mg/day (4           



                                                  gm/day).           



                                                  (Same as:           



                                                  Tylenol           



                                                  Extra           



                                                  Strength)           

 

     Sucralfate            Yes                      1 gm = 10           Me

[facility]



     100 MG/ML      9-29                               ml, PO,           l



     Oral      21:58:                               QID-Before           [facility]



     Suspension      00                                 Meals, #           



     [Carafate]                                         400 ml, 0           



                                                  Refill(s)           

 

     Fluoxetine            Yes                      40 mg = 1           Me

[facility]



     40 MG Oral      9-22                               cap, PO,           l



     Capsule      16:29:                               Daily, #           [facility]

n



     [Prozac]      00                                 30 cap, 0           



                                                  Refill(s)           

 

     Amphetamine            No                       30 mg = 1           M

[facility]



     aspartate      9-20                               tab, PO,           l



     7.5 MG /      18:00:                               BID, 0           [facility]



     Amphetamine      00                                 Refill(s)           



     Sulfate 7.5                                                        



     MG /                                                        



     Dextroamphe                                                        



     tamine                                                        



     saccharate                                                        



     7.5 MG /                                                        



     Dextroamphe                                                        



     tamine                                                        



     Sulfate 7.5                                                        



     MG Oral                                                        



     Tablet                                                        



     [Adderall]                                                        

 

     Albuterol            Yes                      3 mL, NEB,           Me

moria



     0.833 MG/ML      9-20                               QID, 0           l



     /         18:00:                               Refill(s)           [facility]



     Ipratropium      00                                                



     Bromide                                                        



     0.167 MG/ML                                                        



     Inhalant                                                        



     Solution                                                        

 

     Lorazepam 1            No                       1 mg = 1           Me

moria



     MG Oral      9-20                               tab, PO,           l



     Tablet      17:59:                               TID, 0           [facility]



               00                                 Refill(s)           

 

     Sucralfate            Yes                      1 gm = 1           Mem

oria



     1000 MG      7-16                               tab, PO,           l



     Oral Tablet      00:09:                               QID-Before           

[facility]



     [Carafate]      00                                 Meals, #           



                                                  120 tab, 0           



                                                  Refill(s),           



                                                  Pharmacy:           



                                                  [facility]           



                                                  149,           



                                                  167.64,           



                                                  cm,            



                                                  21           



                                                  22:09:00           



                                                  CDT,           



                                                  Height,           



                                                  71.002,           



                                                  kg,            



                                                  21           



                                                  22:09:00           



                                                  CDT,           



                                                  Weight           

 

     pantoprazol            Yes                      40 mg = 1           M

emoria



     e 40 MG      7-16                               tab, PO,           l



     Enteric      00:08:                               BID, # 42           [facility]

nn



     Coated      00                                 tab, 0           



     Tablet                                         Refill(s),           



     [Protonix]                                         Pharmacy:           



                                                  [facility]           



                                                  149,           



                                                  167.64,           



                                                  cm,            



                                                  21           



                                                  22:09:00           



                                                  CDT,           



                                                  Height,           



                                                  71.002,           



                                                  kg,            



                                                  21           



                                                  22:09:00           



                                                  CDT,           



                                                  Weight           

 

     Calcium            No                       Notes:           [facility]



     Gluconate      7-15                               Contains:           l



               14:00:                               calcium           [facility]



                                                gluconate           



                                                  20mg/mL           



                                                  NaCl 0.67%           



                                                  50mL           



                                                  WASTE: F/P           



                                                  - Sink; E           



                                                  -              



                                                  Municipal           



                                                  Trash Bin           

 

     1 ML            Yes                      60 mg = 1           [facility]



     denosumab      7-14                               mL, SUB-Q,           l



     60 MG/ML      03:37:                               ONCE,           [facility]



     Prefilled      00                                 Repeat           



     Syringe                                         every 6           



     [Prolia]                                         months, #           



                                                  1 mL, 0           



                                                  Refill(s)           

 

     Amphetamine            Yes                      30 mg = 1           M

emoria



     aspartate      7-14                               tab, PO,           l



     7.5 MG /      03:36:                               BID, # 60           [facility]

[facility]



     Amphetamine      00                                 tab, 0           



     Sulfate 7.5                                         Refill(s)           



     MG /                                                        



     Dextroamphe                                                        



     tamine                                                        



     saccharate                                                        



     7.5 MG /                                                        



     Dextroamphe                                                        



     tamine                                                        



     Sulfate 7.5                                                        



     MG Oral                                                        



     Tablet                                                        



     [Adderall]                                                        

 

     Fluoxetine            Yes                      40 mg = 1           Me

moria



     40 MG Oral      7-14                               cap, PO,           l



     Capsule      03:36:                               Daily, #           [facility]

n



     [Prozac]      00                                 30 cap, 0           



                                                  Refill(s)           

 

     Hydrochloro            Yes                      25 mg, PO,           

[facility]



     thiazide      7-14                               Daily, 0           l



               03:32:                               Refill(s)           [facility]



               00                                                

 

     Lorazepam 1            Yes                      1 mg = 1           Me

moria



     MG Oral      7-14                               tab, PO,           l



     Tablet      03:31:                               Q8H, PRN           [facility]



     [Ativan]      00                                 Anxiety, #           



                                                  30 tab, 0           



                                                  Refill(s)           

 

     D5NS 1,000            No                       1,000 mL,           Me

moria



     mL        7-14                               Rate: 75           l



               02:12:                               ml/hr,           [facility]



               00                                 Infuse           



                                                  over: 13.3           



                                                  hr, Route:           



                                                  IV, Dosing           



                                                  Weight           



                                                  68.636 kg,           



                                                  Total           



                                                  Volume:           



                                                  1,000,           



                                                  Start           



                                                  date:           



                                                  21           



                                                  21:12:00           



                                                  CDT,           



                                                  Duration:           



                                                  30 day,           



                                                  Stop date:           



                                                  21           



                                                  21:11:00           



                                                  CDT, BSA:           



                                                  1.81 m2, 0           

 

     Zofran            No                       Notes:           [facility]



               7-13                               (Same as:           l



               23:06:                               Zofran)           [facility]



               00                                 ***            



                                                  MEDICATION           



                                                  WASTE ***           



                                                  Product           



                                                  Size: 4 mg           



                                                  Product           



                                                  Wasted:           



                                                  ___ mg           

 

     Glucagon            No                       1 mg,           [facility]



               7-13                               Route: IM,           l



               21:46:                               Drug form:           [facility]



               00                                 PDR/INJ,           



                                                  PRN,           



                                                  Dosing           



                                                  Weight           



                                                  68.636,           



                                                  kg, PRN           



                                                  Blood           



                                                  Glucose           



                                                  Results,           



                                                  Start           



                                                  date:           



                                                  21           



                                                  16:46:00           



                                                  CDT,           



                                                  Duration:           



                                                  30 day,           



                                                  Stop date:           



                                                  21           



                                                  16:45:00           



                                                  CDT, 0           

 

     Ondansetron            No                       Notes:           [facility]

jairo



               7-13                               (Same as:           l



               21:46:                               Zofran)           [facility]



               00                                 ***            



                                                  MEDICATION           



                                                  WASTE ***           



                                                  Product           



                                                  Size: 4 mg           



                                                  Product           



                                                  Wasted:           



                                                  ___ mg           

 

     pantoprazol            No                       Notes: For           

[facility]



     e additive      7-13                               IV push           l



     80 mg +      21:07:                               reconstitu           Herm

nydia



     Sodium      00                                 te with 10           



     Chloride                                         ml 0.9%           



     0.9%                                          sodium           



     mL                                           chloride           



                                                  and push           



                                                  over 2           



                                                  minutes.           



                                                  (Same as:           



                                                  Protonix)           

 

     Protonix            No                       Notes: For           [facility]

[facility]



               7-13                               IV push           l



               21:07:                               reconstitu           [facility]



               00                                 te with 10           



                                                  ml 0.9%           



                                                  sodium           



                                                  chloride           



                                                  and push           



                                                  over 2           



                                                  minutes.           



                                                  (Same as:           



                                                  Protonix)           

 

     Sodium            No                       250 mL,           [facility]



     Chloride      7-13                               Rate: To           l



     0.9%      20:55:                               prime line           [place]



     (titrate)      00                                 and flush           



     250 mL                                         remaining           



                                                  blood           



                                                  products.,           



                                                  Dosing           



                                                  Weight           



                                                  68.636,           



                                                  kg, Route:           



                                                  IV, Total           



                                                  Volume:           



                                                  250, Start           



                                                  Date:           



                                                  21           



                                                  15:55:00           



                                                  CDT,           



                                                  Duration:           



                                                  1 day,           



                                                  Stop date:           



                                                  21           



                                                  15:54:00           



                                                  CDT,           



                                                  Replace           



                                                  Every: 24           



                                                  hr, 0           

 

     Saline            No                       Notes:           [facility]



     Flush 0.9%      7-13                               (Same as:           l



               17:38:                               BD             [place]



               00                                 Posiflush)           







Immunizations







           Ordered Immunization Filled Immunization Date       Status     Commen

ts   Source



           Name       Name                                        

 

           SARS-CoV-2COVID-19mR            2021 Completed             [facility]

[facility]



           [id number]            00:00:00                         [facility]

 

           Marvin-Heide            2021 Completed             [facility]

rial



           [id number]            00:00:00                         [facility]







Vital Signs







             Vital Name   Observation Time Observation Value Comments     Source

 

             Heart Rate   2022-08-10 20:00:00                           [facility]

 [place]

 

             Systolic (mm Hg) 2022-08-10 20:00:00                           [facility]

[facility] [place]

 

             Diastolic (mm Hg) 2022-08-10 20:00:00                           [facility]

[facility]

 

             Height       2022-08-10 20:00:00 167.64 cm                 [facility]

 [facility]

 

             Weight       2022-08-10 20:00:00                           [facility]

 [facility]

 

             BMI Calculated 2022-08-10 20:00:00                           [facility]

al [facility]

 

             Height       2022 18:48:00 167.64 cm                 [facility]

 [facility]

 

             Weight       2022 18:48:00                           [facility]

 [facility]

 

             BMI Calculated 2022 18:48:00                           [facility]

al [place]

 

             Heart Rate   2022 14:02:06                           [facility]

 [place]

 

             Respitory Rate 2022 14:02:06                           [facility]

al [facility]

 

             Systolic (mm Hg) 2022 14:01:57                           [facility]

[facility]

 

             Diastolic (mm Hg) 2022 14:01:57                           [facility]

[facility] [place]

 

             Heart Rate   2022 14:01:57                           [facility]

 [facility]

 

             Systolic (mm Hg) 2022 12:46:31                           [facility]

[facility] [place]

 

             Diastolic (mm Hg) 2022 12:46:31                           [facility]

[facility] [place]

 

             Heart Rate   2022 12:46:31                           [facility]

 [place]

 

             Systolic (mm Hg) 2022 12:44:47                           [facility]

[facility]

 

             Diastolic (mm Hg) 2022 12:44:47                           [facility]

[facility] [place]

 

             Respitory Rate 2022 19:45:00                           [facility]

al [place]

 

             Systolic (mm Hg) 2022 19:45:00                           [facility]

[facility]

 

             Diastolic (mm Hg) 2022 19:45:00                           [facility]

[facility]

 

             Respitory Rate 2022 19:30:00                           [facility]

al [facility]

 

             Systolic (mm Hg) 2022 19:30:00                           [facility]

[facility]

 

             Diastolic (mm Hg) 2022 19:30:00                           [facility]

[facility]

 

             Respitory Rate 2022 19:15:00                           [facility]

al [place]

 

             Systolic (mm Hg) 2022 19:15:00                           [facility]

[facility] [place]

 

             Diastolic (mm Hg) 2022 19:15:00                           [facility]

[facility] [place]

 

             Heart Rate   2022 18:54:00                           [facility]

 [place]

 

             Height       2022 15:50:00 167.64 cm                 [facility]

 [facility]

 

             Weight       2022 15:50:00                           [facility]

 [facility]

 

             BMI Calculated 2022 15:50:00                           [facility]

al [facility]

 

             Heart Rate   2022 15:50:00                           [facility]

 [facility]

 

             Height       2022 18:35:00 167.64 cm                 [facility]

 [facility]

 

             Weight       2022 18:35:00                           [facility]

 [facility]

 

             BMI Calculated 2022 18:35:00                           [facility]

al [facility]

 

             Respitory Rate 2021 20:55:00                           [facility]

al [facility]

 

             Systolic (mm Hg) 2021 20:55:00                           [facility]

[facility]

 

             Diastolic (mm Hg) 2021 20:55:00                           [facility]

[facility] [place]

 

             Respitory Rate 2021 20:40:00                           [facility]

al [facility]

 

             Systolic (mm Hg) 2021 20:40:00                           [facility]

[facility]

 

             Diastolic (mm Hg) 2021 20:40:00                           [facility]

[facility] [place]

 

             Respitory Rate 2021 20:25:00                           [facility]

al [place]

 

             Systolic (mm Hg) 2021 20:25:00                           [facility]

[facility] [place]

 

             Diastolic (mm Hg) 2021 20:25:00                           [facility]

[facility]

 

             Heart Rate   2021 17:00:00                           [facility]

 [facility]

 

             Respitory Rate 2021 17:00:00                           [facility]

al [facility]

 

             Systolic (mm Hg) 2021 17:00:00                           [facility]

[facility]

 

             Diastolic (mm Hg) 2021 17:00:00                           [facility]

[facility] [place]

 

             Height       2021 16:40:00 166.37 cm                 [facility]

 [place]

 

             Weight       2021 16:40:00                           [facility]

 [place]

 

             BMI Calculated 2021 16:40:00                           [facility]

al [facility]

 

             Respitory Rate 2021-10-04 21:00:00                           [facility]

al [facility]

 

             Systolic (mm Hg) 2021-10-04 21:00:00                           [facility]

[facility]

 

             Diastolic (mm Hg) 2021-10-04 21:00:00                           [facility]

[facility] [place]

 

             Respitory Rate 2021-10-04 20:40:00                           [facility]

al [facility]

 

             Systolic (mm Hg) 2021-10-04 20:40:00                           [facility]

[facility]

 

             Diastolic (mm Hg) 2021-10-04 20:40:00                           [facility]

[facility] [place]

 

             Respitory Rate 2021-10-04 19:35:00                           [facility]

al [place]

 

             Systolic (mm Hg) 2021-10-04 19:35:00                           [facility]

[facility]

 

             Diastolic (mm Hg) 2021-10-04 19:35:00                           Mem

[facility]

 

             Heart Rate   2021-10-01 18:30:00                           [facility]

 [place]

 

             Respitory Rate 2021-10-01 18:30:00                           [facility]

al [place]

 

             Systolic (mm Hg) 2021-10-01 18:30:00                           [facility]

[facility]

 

             Diastolic (mm Hg) 2021-10-01 18:30:00                           [facility]

[facility] [place]

 

             Height       2021-10-01 18:29:00 167.64 cm                 [facility]

 [facility]

 

             Weight       2021-10-01 18:29:00                           [facility]

 [place]

 

             BMI Calculated 2021-10-01 18:29:00                           [facility]

al [place]

 

             Respitory Rate 2021 19:55:00                           [facility]

al [place]

 

             Respitory Rate 2021 19:45:00                           [facility]

al [facility]

 

             Systolic (mm Hg) 2021 19:45:00                           [facility]

[facility] [place]

 

             Diastolic (mm Hg) 2021 19:45:00                           [facility]

[facility]

 

             Respitory Rate 2021 19:30:00                           [facility]

al [place]

 

             Systolic (mm Hg) 2021 19:30:00                           [facility]

[facility]

 

             Diastolic (mm Hg) 2021 19:30:00                           [facility]

[facility] [place]

 

             Systolic (mm Hg) 2021 19:15:00                           [facility]

[facility]

 

             Diastolic (mm Hg) 2021 19:15:00                           [facility]

[facility] [place]

 

             Heart Rate   2021 16:26:19                           [facility]

 [facility]

 

             Heart Rate   2021 16:24:51                           [facility]

 [place]

 

             Height       2021 16:21:00 166.37 cm                 [facility]

 [place]

 

             Weight       2021 16:21:00                           [facility]

 [facility]

 

             BMI Calculated 2021 16:21:00                           [facility]

al [facility]

 

             Temperature Oral (F) 2021 16:00:00 98 F                      

[facility]

 

             Heart Rate   2021-07-15 20:10:23                           [facility]

 [facility]

 

             Respitory Rate 2021-07-15 20:10:23                           [facility]

al [place]

 

             Systolic (mm Hg) 2021-07-15 20:09:55                           [facility]

[facility]

 

             Diastolic (mm Hg) 2021-07-15 20:09:55                           Mem

[facility] [place]

 

             Heart Rate   2021-07-15 20:09:55                           [facility]

 [facility]

 

             Temperature Oral (F) 2021-07-15 20:09:39 99 F                      

[facility]

 

             Heart Rate   2021-07-15 16:03:34                           [facility]

 [facility]

 

             Respitory Rate 2021-07-15 16:03:34                           [facility]

al [facility]

 

             Systolic (mm Hg) 2021-07-15 16:03:03                           [facility]

[facility] [place]

 

             Diastolic (mm Hg) 2021-07-15 16:03:03                           Mem

[facility] [place]

 

             Temperature Oral (F) 2021-07-15 16:02:47 98.5 F                    

[facility] [place]

 

             Temperature Oral (F) 2021-07-15 13:04:00 98.4 F                    

[facility] [place]

 

             Respitory Rate 2021-07-15 13:04:00                           [facility]

al [place]

 

             Systolic (mm Hg) 2021-07-15 13:04:00                           [facility]

[facility]

 

             Diastolic (mm Hg) 2021-07-15 13:04:00                           Mem

[facility] [place]

 

             Height       2021 03:09:00 167.64 cm                 [facility]

 [place]

 

             Weight       2021 03:09:00                           [facility]

 [facility]

 

             BMI Calculated 2021 03:09:00                           [facility]

al [facility]

 

             Height       2021 17:36:00 170.18 cm                 [facility]

 [place]

 

             BMI Calculated 2021 17:36:00                           [facility]

al [place]

 

             Weight       2021 17:36:00                           [facility]

 [place]







Procedures







                Procedure       Date / Time     Performing      Source



                                Performed       Clinician       

 

                ECHO COMPLETE -CLINIC 2022      [facility]



                                14:26:23        K               

 

                [facility]oden[facility]                                

 [facility]







Encounters







        Start   End     Encounter Admission Attending Care    Care    Encounter 

Source



        Date/Time Date/Time Type    Type    Clinicians Facility Department ID   

   

 

        2022-10-10         Outpatient         GANNON,  [facility]    CAR     [id number]    [facility]



        12:03:08                         TAY                         

 

        2022         Outpatient         CASH,   [facility]     [id number]

 [place]



        01:05:27                         GEE                  [id number] [facility]

 

        2022         Outpatient         RAMIREDDY, [facility]     [id number]

1-2 [place]



        01:03:38                         MAUREEN                 [id number] [facility]

 

        2022         Outpatient                 UTH     UTH     [id number]

 [place]



        08:43:20                                                 [id number] [facility]

 

        2022         Outpatient         RAMIREDDY, [facility]    [id number]   

 [facility]



        11:28:18                         MAUREEN                         

 

        2022-08-15 2022 Outpatient                 nullFlavo [facility] 3622

[id number] [facility]



        13:44:00 04:59:00                         r       [place] 18      l



                                                        Greater         Unruly



                                                        Heights         

 

        2022-08-15 2022-08-15 Outpatient         [facility]   [id number]

[id number] 



        08:44:00 23:59:00                 Tay                 18      



                                        Juan Jhiram                         

 

        2022-08-15 2022-08-15 Outpatient         [facility]     [id number]   

 [facility]



        08:44:00 23:59:00                 TAY                         

 

        2022-08-10 2022 Outpatient                 nullFlavo       [id number]

[id number] [facility]



        19:29:00 04:59:00                         r       Community 17      l



                                                        Cardiology         [facility]         

 

        2022-08-10 2022-08-10 Outpatient         GANNON,  [facility]    CAR     [id number]   

 [facility]



        14:29:00 23:59:00                 TAY                         

 

        2022-08-10 2022-08-10 Outpatient         Gannon,  [facility]   [id number]

[id number] 



        14:29:00 23:59:00                 Tay                 Fidencio Aritahiteddy                         

 

        2022-08-10 2022-08-10 Office          GANNON,  [facility] [id number]

[id number] [place]



        14:30:00 14:30:00 [facility]           [phone]      

   [facility]



                                                        [phone]         



                                                        [phone]         



                                                        0               

 

        2022 Outpatient                 nullFlavo [facility] 3622

[id number] [facility]



        17:55:00 04:59:00                         r       [facility] 16      l



                                                        [facility]         

 

        2022 [facility]         [facility]   [facility]   [id number]

[id number] 



        12:55:00 23:59:00                 Harmohinder                 16      



                                        S                               

 

        2022 Outpatient         Thang, [facility]   [facility]   [id number]

[id number] 



        12:55:00 23:59:00                 Harmohinder                 16      



                                        S                               

 

        2022 Outpatient         THANG, [facility]    PUL     [id number]   

 [facility]



        12:55:00 23:59:00                 HARMOHINDER                         

 

        [id number]

[facility]



        12:28:00 12:28:00 Outpatient                 r       [facility] 15      l



                                                        [facility]

 

        2022 Outpatient         CASH,   [facility]    [id number]   

 [facility]



        07:28:00 09:02:00                 GEE                          

 

        2022 Outpatient         Bell,   [facility]   [id number]

[id number] 



        07:28:00 07:28:00                 Gee                  Clay Almanza                         

 

        2022 Outpatient                 nullFlavo [place] [address]

[id number] [facility]



        15:00:00 04:59:59                         r       Care Clear 01      l



                                                        [facility]

 

        2022 Outpatient                 [facility]    [id number]

[id number] 



        10:00:00 23:59:59                                         2022 Outpatient                 [facility]    [id number]

765 [facility]



        10:00:00 10:00:00                                         01      [facility]

 

        2022 Day                     nullFlavo [facility] [id number]

[id number] [facility]



        14:07:00 05:59:00 Surgery                 [facility] 56      [facility]

 

        2022 Outpatient         RAMIREDDY, [facility]    

    [facility]



        08:07:00 23:59:00                 MAUREEN                         

 

        2022 Outpatient         Ramireddy, [facility]   [id number]

[id number] 



        08:07:00 23:59:00                 Maureen                 56      

 

        2022 Day                     nullFlavo [facility] [id number]

775 [facility]



        16:00:00 16:00:00 Surgery                 [facility] 14      [facility]

 

        2022 Outpatient         Ramireddy, [facility]   3622

[id number] 



        10:00:00 10:00:00                 Maureen                 14      

 

        2022 Outpatient         Ramireddy, [facility]   3622

[id number] 



        10:00:00 10:00:00                 Maureen                 14      

 

        2022 Outpatient                 nullFlavo [facility] 362

[id number] Memoria



        19:10:00 05:59:59                         r       Care Clear 00      [facility]

 

        2022 Outpatient                 [facility]    [facility]    [id number]

[id number] 



        13:10:00 23:59:59                                         00      

 

        2022 Outpatient                 [facility]    [facility]    [id number]

765 Memoria



        13:10:00 13:10:00                                         00      [facility]

 

        2022 Outpatient                 nullFlavo [facility] 362

[id number] Memoria



        19:49:00 05:59:00                         r       Disease 13      [facility]

 

        2022 Outpatient         RAMIREDDY, [facility]    7513

    [facility]



        13:49:00 23:59:00                 MAUREEN                         

 

        2022 Outpatient         Wang [facility]   3622

[id number] 



        13:49:00 23:59:00                 Maureen                 13      

 

        2021 Day                     [facility]Flavo [facility] [id number]

775 Memoria



        18:08:00 22:28:00 Surgery                 r       Unruly 12      l



                                                        [facility]         

 

        2021 Outpatient         [facility]   [id number]

775 



        12:08:00 16:28:00                 Toney                  12      



                                        Dolores                         

 

        2021 Outpatient         LAKSHMI,  NW    NW    [id number]   

 NW



        12:08:00 16:28:00                 TONEY                          

 

        2021 Outpatient         [facility]shmi[facility]   [id number]

775 



        13:00:00 13:00:00                 Toney                  12      



                                        Dolores                         

 

        2021-10-04 2021-10-04 Day                     [facility]Flavo [facility] [id number]

775 Memoria



        19:00:00 21:09:00 Surgery                 r       [place] 11      l



                                                        [facility]         

 

        2021-10-04 2021-10-04 Outpatient         Lakshmi[facility]  [facility]   [id number]

775 



        14:00:00 16:09:00                 Toney                  11      



                                        Dolores                         

 

        2021-10-04 2021-10-04 Outpatient         LAKSHMI,  NW    NW    [id number]   

 NW



        14:00:00 16:09:00                 TONEY                          

 

        2021-10-04 2021-10-04 Outpatient         [facility]shmi[facility]  [facility]   [id number]

775 



        14:00:00 14:00:00                 Toney                  11      



                                        Dolores                         

 

        2021 Day                     [facility]Fl[facility] [id number]

775 Memoria



        16:20:00 20:03:00 Surgery                 r       Unruly 10      l



                                                        [facility]         

 

        2021 Outpatient         Lakshmi[facility]  [facility]   [id number]

775 



        11:20:00 15:03:00                 Toney                  10      



                                        Dolores                         

 

        2021 Outpatient         Lakshmi,  [facility]   [facility]   [id number]

775 



        11:20:00 15:03:00                 Toney                  Nava Coyleshmi                         

 

        2021 Outpatient         LAKSHMI  [facility]    NW    7510   

 [facility]



        11:20:00 15:03:00                 TONEY                          

 

        2021 Inpatient                 nullFlavo [facility] [id number]

[id number] Memoria



        17:19:00 01:04:00                         r       Unruly 09      l



                                                        [facility]         

 

        2021 2021-07-15 Inpatient E       LAW,    [facility]    PUL     [id number]    

[facility]



        16:18:00 20:04:00                 [facility]                         

 

        2021 2021-07-15 Outpatient         Law,    [facility]   [id number]

775 



        12:19:00 20:04:00                 [facility]                 09      



                                        B K                             

 

        2021 2021-07-15 Outpatient         Foy,    [facility]   [id number]

775 



        12:19:00 20:04:00                 [facility]                 09      



                                        B K                             

 

        2021 Outpatient                 nullFlavo [facility] 3622

[id number] Memoria



        16:52:00 04:59:00                         r       Unruly 08      l



                                                        [facility]         

 

        2021 Outpatient         Nayak,   [facility]   [id number]

775 



        11:52:00 23:59:00                 Clover Goldstein      



                                        [facility]                         

 

        2021 Outpatient         MALINI,   NW    MED     [id number]   

 [facility]



        11:52:00 23:59:00                 CLOVER                         

 

        2021 Outpatient                 nullFlavo [facility] 3622

[id number] Memoria



        15:26:00 04:59:00                         r       Unruly 07      l



                                                        [facility]         

 

        2021 Outpatient         MALINI,   NW    MED     [id number]   

 [facility]



        10:26:00 23:59:00                 CLOVER                         

 

        2021 Outpatient         Nayak,   [facility]   [id number]

775 



        10:26:00 23:59:00                 Clover Elliott      



                                        [facility]                         

 

        2018-10-02 2018-10-03 Outpatient                 nullFlavo [facility] 362

[id number] Memoria



        15:09:00 04:59:00                         r       [place] 06      l



                                                        [facility]         

 

        2018-10-02 2018-10-02 [facility]   [id number]

775 



        10:09:00 23:59:00                 Cloverdaniel Olson      



                                        [facility]                         

 

        2017-10-10 2017-10-11 [facility]

[id number] Memoria



        18:58:00 04:59:00                         r       Unruly 05      l



                                                        [facility]         

 

        2017-10-10 2017-10-10 [facility]   [id number]

775 



        13:58:00 23:59:00                 Clover                 05      



                                        AmyBen                         

 

        2017 Outpatient                 [place] [address]

[id number] Memoria



        15:11:00 04:59:00                         r       [place] 04      l



                                                        [facility]         

 

        2017 [facility]   [id number]

775 



        10:11:00 23:59:00                 Clover Durham      



                                        [facility]                         







Results







           Test Description Test Time  Test Comments Results    Result Comments 

Source









                    [facility]          2022 16:13:00 









                      Test Item  Value      Reference Range Interpretation Comme

nts









             Coronavirus (COVID-19) APARNA (test code = Not Detected *NA*(22 11

:13 AM)                           



             Coronavirus (COVID-19) APARNA)                                        



[facility] 15:10:00





             Test Item    Value        Reference Range Interpretation Comments

 

             Coronavirus (COVID-19) Not Detected                           



             APARNA (test code = *NA*(22 10:10                           



             Coronavirus (COVID-19) AM)                                    



             APARNA)                                                



[facility] [id number] 16:06:00





             Test Item    Value        Reference Range Interpretation Comments

 

             Glucose Lvl (test code = Glucose Lvl) 104          70-99           

          



[facility] [id number] 16:06:00





             Test Item    Value        Reference Range Interpretation Comments

 

             BUN (test code = BUN) 17                                 



[facility]2022-02-25 16:06:00





             Test Item    Value        Reference Range Interpretation Comments

 

             Creatinine Lvl (test code = Creatinine 1.12         0.50-1.40      

           



             Lvl)                                                



[facility] [id number] 16:06:00





             Test Item    Value        Reference Range Interpretation Comments

 

             Sodium Lvl (test code = Sodium Lvl) 137          135-145           

        



[place] [id number] 16:06:00





             Test Item    Value        Reference Range Interpretation Comments

 

             Potassium Lvl (test code = Potassium 3.3          3.5-5.1          

         



             Lvl)                                                



[place] [id number] 16:06:00





             Test Item    Value        Reference Range Interpretation Comments

 

             Chloride Lvl (test code = Chloride Lvl) 106                  

            



[place] 410202-02-25 16:06:00





             Test Item    Value        Reference Range Interpretation Comments

 

             CO2 (test code = CO2) 26           24-32                     



[place] 410202-02-25 16:06:00





             Test Item    Value        Reference Range Interpretation Comments

 

             Calcium Lvl (test code = Calcium Lvl) 9.0          8.5-10.5        

          



[place] 410202-02-25 16:06:00





             Test Item    Value        Reference Range Interpretation Comments

 

             AGAP (test code = AGAP) 8.3          10.0-20.0                 



[place] 410202-02-25 16:06:00





             Test Item    Value        Reference Range Interpretation Comments

 

             eGFR (test code = eGFR) 51                                     



[place] [id number] 16:06:00





             Test Item    Value        Reference Range Interpretation Comments

 

             WBC (test code = WBC) 5.9          3.7-10.4                  



[place] 661862-02-25 16:06:00





             Test Item    Value        Reference Range Interpretation Comments

 

             RBC (test code = RBC) 4.15         4.20-5.40                 



[place] 661862-02-25 16:06:00





             Test Item    Value        Reference Range Interpretation Comments

 

             Hgb (test code = Hgb) 9.9          12.0-16.0                 



[place] [address]-02-25 16:06:00





             Test Item    Value        Reference Range Interpretation Comments

 

             Hct (test code = Hct) 31.2         36.0-48.0                 



[place] [address]-02-25 16:06:00





             Test Item    Value        Reference Range Interpretation Comments

 

             MCV (test code = MCV) 75.2         80.0-98.0                 



[place] [address]-02-25 16:06:00





             Test Item    Value        Reference Range Interpretation Comments

 

             MCH (test code = MCH) 23.8 pg      27.0-31.0                 



[place] 661862-02-25 16:06:00





             Test Item    Value        Reference Range Interpretation Comments

 

             MCHC (test code = MCHC) 31.7         32.0-36.0                 



[place] 661862-02-25 16:06:00





             Test Item    Value        Reference Range Interpretation Comments

 

             RDW (test code = RDW) 17.7         11.5-14.5                 



[place] [id number] 16:06:00





             Test Item    Value        Reference Range Interpretation Comments

 

             Platelet (test code = Platelet) 391          133-450               

    



[place] 661862-02-25 16:06:00





             Test Item    Value        Reference Range Interpretation Comments

 

             MPV (test code = MPV) 7.1          7.4-10.4                  



[place] 661862-02-25 16:06:00





             Test Item    Value        Reference Range Interpretation Comments

 

             Segs (test code = Segs) 73.5         45.0-75.0                 



[place] 661862-02-25 16:06:00





             Test Item    Value        Reference Range Interpretation Comments

 

             Lymphocytes (test code = Lymphocytes) 19.4         20.0-40.0       

          



[place] 661862-02-25 16:06:00





             Test Item    Value        Reference Range Interpretation Comments

 

             Monocytes (test code = Monocytes) 5.4          2.0-12.0            

      



[place] 661862-02-25 16:06:00





             Test Item    Value        Reference Range Interpretation Comments

 

             Eosinophils (test code = 1.1          See_Comment                [A

utomated message] The



             Eosinophils)                                        system which ge

nerated



                                                                 this result tra

nsmitted



                                                                 reference range

: <=4.0.



                                                                 The reference r

slim was



                                                                 not used to int

erpret



                                                                 this result as



                                                                 normal/abnormal

.



[place] [id number] 16:06:00





             Test Item    Value        Reference Range Interpretation Comments

 

             Basophils (test code = 0.6          See_Comment                [Aut

omated message] The



             Basophils)                                          system which ge

nerated



                                                                 this result tra

nsmitted



                                                                 reference range

: <=1.0.



                                                                 The reference r

slim was



                                                                 not used to int

erpret



                                                                 this result as



                                                                 normal/abnormal

.



[place] 661862-02-25 16:06:00





             Test Item    Value        Reference Range Interpretation Comments

 

             Neutrophils # (test code = Neutrophils 4.4          1.5-8.1        

           



             #)                                                  



[place] 661862-02-25 16:06:00





             Test Item    Value        Reference Range Interpretation Comments

 

             Lymphocytes # (test code = Lymphocytes 1.2          1.0-5.5        

           



             #)                                                  



[facility]90[facility] 16:06:00





             Test Item    Value        Reference Range Interpretation Comments

 

             Monocytes # (test code 0.3          See_Comment                [Aut

omated message] The



             = Monocytes #)                                        system which 

generated



                                                                 this result tra

nsmitted



                                                                 reference range

: <=0.8.



                                                                 The reference r

slim was



                                                                 not used to int

erpret



                                                                 this result as



                                                                 normal/abnormal

.



[facility] CenterLwrgjniOZNXFEOYXO6638-74-22 16:06:00





             Test Item    Value        Reference Range Interpretation Comments

 

             Eosinophils # (test code 0.1          See_Comment                [A

utomated message] The



             = Eosinophils #)                                        system whic

h generated



                                                                 this result tra

nsmitted



                                                                 reference range

: <=0.5.



                                                                 The reference r

slim was



                                                                 not used to int

erpret



                                                                 this result as



                                                                 normal/abnormal

.



[facility] Center[facility]xvtoreYDVCQZXEQO7754-21-17 16:06:00





             Test Item    Value        Reference Range Interpretation Comments

 

             Microcyte (test code = 1+ *ABN*(22                           



             Microcyte)   10:06 AM)                              



[facility]27[facility] 19:02:00





             Test Item    Value        Reference Range Interpretation Comments

 

             Coronavirus (COVID-19) Not Detected                           



             APARNA (test code = *NA*(22 1:02 PM)                           



             Coronavirus (COVID-19)                                        



             APARNA)                                                



[facility]2021-12-03 16:40:00





             Test Item    Value        Reference Range Interpretation Comments

 

             Glucose Lvl (test code = Glucose Lvl) 95           70-99           

          



[facility]2021-12-03 16:40:00





             Test Item    Value        Reference Range Interpretation Comments

 

             BUN (test code = BUN) 17           7-22                      



[place] 410201-12-03 16:40:00





             Test Item    Value        Reference Range Interpretation Comments

 

             Creatinine Lvl (test code = Creatinine 1.09         0.50-1.40      

           



             Lvl)                                                



[facility]2021-12-03 16:40:00





             Test Item    Value        Reference Range Interpretation Comments

 

             Sodium Lvl (test code = Sodium Lvl) 138          135-145           

        



[facility]2021-12-03 16:40:00





             Test Item    Value        Reference Range Interpretation Comments

 

             Potassium Lvl (test code = Potassium 3.7          3.5-5.1          

         



             Lvl)                                                



[facility] [place] [id number] 16:40:00





             Test Item    Value        Reference Range Interpretation Comments

 

             Chloride Lvl (test code = Chloride Lvl) 106                  

            



[place] [address]-12-03 16:40:00





             Test Item    Value        Reference Range Interpretation Comments

 

             CO2 (test code = CO2) 27           24-32                     



[place] [address]-12-03 16:40:00





             Test Item    Value        Reference Range Interpretation Comments

 

             Calcium Lvl (test code = Calcium Lvl) 9.0          8.5-10.5        

          



[facility] [place] [id number] 16:40:00





             Test Item    Value        Reference Range Interpretation Comments

 

             Total Protein (test code = Total 7.7          6.4-8.4              

     



             Protein)                                            



[address]12-03 16:40:00





             Test Item    Value        Reference Range Interpretation Comments

 

             Albumin Lvl (test code = Albumin Lvl) 3.7          3.5-5.0         

          



[facility] [id number] 16:40:00





             Test Item    Value        Reference Range Interpretation Comments

 

             ALT (test code = ALT) 20           See_Comment                [Auto

mated message] The



                                                                 system which ge

nerated this



                                                                 result transmit

maria luz



                                                                 reference range

: <=65. The



                                                                 reference range

 was not



                                                                 used to interpr

et this



                                                                 result as kirsten

l/abnormal.



[facility] [id number] 16:40:00





             Test Item    Value        Reference Range Interpretation Comments

 

             AST (test code = AST) 16           See_Comment                [Auto

mated message] The



                                                                 system which ge

nerated this



                                                                 result transmit

maria luz



                                                                 reference range

: <=37. The



                                                                 reference range

 was not



                                                                 used to interpr

et this



                                                                 result as kirsten

l/abnormal.



[facility] [id number] 16:40:00





             Test Item    Value        Reference Range Interpretation Comments

 

             Alk Phos (test code = Alk Phos) 102                          

    



[facility] [id number] 16:40:00





             Test Item    Value        Reference Range Interpretation Comments

 

             Bili Total (test code = Bili Total) 0.3          0.2-1.3           

        



[address]12-03 16:40:00





             Test Item    Value        Reference Range Interpretation Comments

 

             AGAP (test code = AGAP) 8.7          10.0-20.0                 



[facility] [id number] 16:40:00





             Test Item    Value        Reference Range Interpretation Comments

 

             B/C Ratio (test code = B/C Ratio) 16 1         6-25                

      



[facility]2021-12-03 16:40:00





             Test Item    Value        Reference Range Interpretation Comments

 

             Globulin (test code = Globulin) 4.0          2.7-4.2               

    



[place] 410201-12-03 16:40:00





             Test Item    Value        Reference Range Interpretation Comments

 

             A/G Ratio (test code = A/G Ratio) 0.9 1        0.7-1.6             

      



[place] 410201-12-03 16:40:00





             Test Item    Value        Reference Range Interpretation Comments

 

             eGFR (test code = eGFR) 53                                     



[facility]yzufdsVDAKCAACYM4039[facility]30[facility]61 16[facility]40:00





             Test Item    Value        Reference Range Interpretation Comments

 

             WBC (test code = WBC) 4.9          3.7-10.4                  



[place] 661861-12-03 16:40:00





             Test Item    Value        Reference Range Interpretation Comments

 

             RBC (test code = RBC) 4.46         4.20-5.40                 



[facility]JftnwieHVIAAALVNU9260[facility] 16:40:00





             Test Item    Value        Reference Range Interpretation Comments

 

             Hgb (test code = Hgb) 10.1         12.0-16.0                 



[facility] CenterZneijfoCBIEWRMFFF4908-29-05 16:40:00





             Test Item    Value        Reference Range Interpretation Comments

 

             Hct (test code = Hct) 31.9         36.0-48.0                 



[place] 661861-12-03 16:40:00





             Test Item    Value        Reference Range Interpretation Comments

 

             MCV (test code = MCV) 71.4         80.0-98.0                 



[place] 661861-12-03 16:40:00





             Test Item    Value        Reference Range Interpretation Comments

 

             MCH (test code = MCH) 22.5 pg      27.0-31.0                 



[place] 661861-12-03 16:40:00





             Test Item    Value        Reference Range Interpretation Comments

 

             MCHC (test code = MCHC) 31.6         32.0-36.0                 



[facility] CenterYhvqdfeIGTBXXYFBD3980-22-89 16:40:00





             Test Item    Value        Reference Range Interpretation Comments

 

             RDW (test code = RDW) 21.2         11.5-14.5                 



[facility] CenterAbfeewxCXMGNETGDE1620-03-46 16:40:00





             Test Item    Value        Reference Range Interpretation Comments

 

             Platelet (test code = Platelet) 414          133-450               

    



[place] 661861-12-03 16:40:00





             Test Item    Value        Reference Range Interpretation Comments

 

             MPV (test code = MPV) 7.0          7.4-10.4                  



[place] [address]-12-03 16:40:00





             Test Item    Value        Reference Range Interpretation Comments

 

             PT (test code = PT) 12.2 s       12.0-14.7                 



[place] [address]-12-03 16:40:00





             Test Item    Value        Reference Range Interpretation Comments

 

             INR (test code = INR) 0.91 1       0.85-1.17                 



[place] [address]-12-03 16:40:00





             Test Item    Value        Reference Range Interpretation Comments

 

             PTT (test code = PTT) 28.5 s       22.9-35.8                 



[place] [address]-12-03 16:40:00





             Test Item    Value        Reference Range Interpretation Comments

 

             Segs (test code = Segs) 67.6         45.0-75.0                 



[address]12-03 16:40:00





             Test Item    Value        Reference Range Interpretation Comments

 

             Lymphocytes (test code = Lymphocytes) 24.1         20.0-40.0       

          



[place] [address]-12-03 16:40:00





             Test Item    Value        Reference Range Interpretation Comments

 

             Monocytes (test code = Monocytes) 5.9          2.0-12.0            

      



[place] [address]-12-03 16:40:00





             Test Item    Value        Reference Range Interpretation Comments

 

             Eosinophils (test code = 1.6          See_Comment                [A

utomated message] The



             Eosinophils)                                        system which ge

nerated



                                                                 this result tra

nsmitted



                                                                 reference range

: <=4.0.



                                                                 The reference r

slim was



                                                                 not used to int

erpret



                                                                 this result as



                                                                 normal/abnormal

.



[place] 661861-12-03 16:40:00





             Test Item    Value        Reference Range Interpretation Comments

 

             Basophils (test code = 0.8          See_Comment                [Aut

omated message] The



             Basophils)                                          system which ge

nerated



                                                                 this result tra

nsmitted



                                                                 reference range

: <=1.0.



                                                                 The reference r

slim was



                                                                 not used to int

erpret



                                                                 this result as



                                                                 normal/abnormal

.



[place] [address]-12-03 16:40:00





             Test Item    Value        Reference Range Interpretation Comments

 

             Neutrophils # (test code = Neutrophils 3.3          1.5-8.1        

           



             #)                                                  



[place] 661861-12-03 16:40:00





             Test Item    Value        Reference Range Interpretation Comments

 

             Lymphocytes # (test code = Lymphocytes 1.2          1.0-5.5        

           



             #)                                                  



[facility] CenterRkggufaHVDFMQYPIM8303-41-05 16:40:00





             Test Item    Value        Reference Range Interpretation Comments

 

             Monocytes # (test code 0.3          See_Comment                [Aut

omated message] The



             = Monocytes #)                                        system which 

generated



                                                                 this result tra

nsmitted



                                                                 reference range

: <=0.8.



                                                                 The reference r

slim was



                                                                 not used to int

erpret



                                                                 this result as



                                                                 normal/abnormal

.



[place] 661861-12-03 16:40:00





             Test Item    Value        Reference Range Interpretation Comments

 

             Eosinophils # (test code 0.1          See_Comment                [A

utomated message] The



             = Eosinophils #)                                        system whic

h generated



                                                                 this result tra

nsmitted



                                                                 reference range

: <=0.5.



                                                                 The reference r

slim was



                                                                 not used to int

erpret



                                                                 this result as



                                                                 normal/abnormal

.



[facility] CenterLlyqebaKZQLYIMVNZ6918-98-34 16:40:00





             Test Item    Value        Reference Range Interpretation Comments

 

             Microcyte (test code = 1+ *ABN*(12/3/21                           



             Microcyte)   10:40 AM)                              



[place] [address]-12-03 16:40:00





             Test Item    Value        Reference Range Interpretation Comments

 

             Coronavirus (COVID-19) Not Detected                           



             APARNA (test code = *NA*(12/3/21 10:40                           



             Coronavirus (COVID-19) AM)                                    



             APARNA)                                                



[place] [address]-10-01 18:33:00





             Test Item    Value        Reference Range Interpretation Comments

 

             Coronavirus (COVID-19) Not Detected                           



             APARNA (test code = *NA*(10/1/21 1:33 PM)                           



             Coronavirus (COVID-19)                                        



             APARNA)                                                



[facility]2021-09-20 16:29:00





             Test Item    Value        Reference Range Interpretation Comments

 

             Glucose Lvl (test code = Glucose Lvl) 92           70-99           

          



[facility]2021-09-20 16:29:00





             Test Item    Value        Reference Range Interpretation Comments

 

             BUN (test code = BUN) 17           7-22                      



[facility]2021-09-20 16:29:00





             Test Item    Value        Reference Range Interpretation Comments

 

             Creatinine Lvl (test code = Creatinine 1.06         0.50-1.40      

           



             Lvl)                                                



[facility]2021-09-20 16:29:00





             Test Item    Value        Reference Range Interpretation Comments

 

             Sodium Lvl (test code = Sodium Lvl) 138          135-145           

        



[place] 410201-09-20 16:29:00





             Test Item    Value        Reference Range Interpretation Comments

 

             Potassium Lvl (test code = Potassium 4.8          3.5-5.1          

         



             Lvl)                                                



[place] 410201-09-20 16:29:00





             Test Item    Value        Reference Range Interpretation Comments

 

             Chloride Lvl (test code = Chloride Lvl) 106                  

            



[place] 410201-09-20 16:29:00





             Test Item    Value        Reference Range Interpretation Comments

 

             CO2 (test code = CO2) 27           24-32                     



[place] 410201-09-20 16:29:00





             Test Item    Value        Reference Range Interpretation Comments

 

             Calcium Lvl (test code = Calcium Lvl) 8.6          8.5-10.5        

          



[place] 410201-09-20 16:29:00





             Test Item    Value        Reference Range Interpretation Comments

 

             Total Protein (test code = Total 7.8          6.4-8.4              

     



             Protein)                                            



[facility]2021-09-20 16:29:00





             Test Item    Value        Reference Range Interpretation Comments

 

             Albumin Lvl (test code = Albumin Lvl) 3.7          3.5-5.0         

          



[id number] 16[facility]29:00





             Test Item    Value        Reference Range Interpretation Comments

 

             ALT (test code = ALT) 15           See_Comment                [Auto

mated message] The



                                                                 system which ge

nerated this



                                                                 result transmit

maria luz



                                                                 reference range

: <=65. The



                                                                 reference range

 was not



                                                                 used to interpr

et this



                                                                 result as kirsten

l/abnormal.



[facility] [id number] 16:29:00





             Test Item    Value        Reference Range Interpretation Comments

 

             AST (test code = AST) 14           See_Comment                [Auto

mated message] The



                                                                 system which ge

nerated this



                                                                 result transmit

maria luz



                                                                 reference range

: <=37. The



                                                                 reference range

 was not



                                                                 used to interpr

et this



                                                                 result as kirsten

l/abnormal.



[facility] [id number] 16:29:00





             Test Item    Value        Reference Range Interpretation Comments

 

             Alk Phos (test code = Alk Phos) 94                           

    



[facility] [id number] 16:29:00





             Test Item    Value        Reference Range Interpretation Comments

 

             Bili Total (test code = Bili Total) 0.2          0.2-1.3           

        



[facility]2021-09-20 16:29:00





             Test Item    Value        Reference Range Interpretation Comments

 

             AGAP (test code = AGAP) 9.8          10.0-20.0                 



[place] 410201-09-20 16:29:00





             Test Item    Value        Reference Range Interpretation Comments

 

             B/C Ratio (test code = B/C Ratio) 16 1         6-25                

      



[place] 410201-09-20 16:29:00





             Test Item    Value        Reference Range Interpretation Comments

 

             Globulin (test code = Globulin) 4.1          2.7-4.2               

    



[place] 410201-09-20 16:29:00





             Test Item    Value        Reference Range Interpretation Comments

 

             A/G Ratio (test code = A/G Ratio) 0.9 1        0.7-1.6             

      



[place] 410201-09-20 16:29:00





             Test Item    Value        Reference Range Interpretation Comments

 

             eGFR (test code = eGFR) 55                                     



[facility] CenterRjpootbZXUUSCBYOK6327-70-17 16:29:00





             Test Item    Value        Reference Range Interpretation Comments

 

             WBC (test code = WBC) 5.5          3.7-10.4                  



[place] 661861-09-20 16:29:00





             Test Item    Value        Reference Range Interpretation Comments

 

             RBC (test code = RBC) 4.33         4.20-5.40                 



[place] 661861-09-20 16:29:00





             Test Item    Value        Reference Range Interpretation Comments

 

             Hgb (test code = Hgb) 9.8          12.0-16.0                 



[place] 661861-09-20 16:29:00





             Test Item    Value        Reference Range Interpretation Comments

 

             Hct (test code = Hct) 31.6         36.0-48.0                 



[place] 661861-09-20 16:29:00





             Test Item    Value        Reference Range Interpretation Comments

 

             MCV (test code = MCV) 73.0         80.0-98.0                 



[place] 661861-09-20 16:29:00





             Test Item    Value        Reference Range Interpretation Comments

 

             MCH (test code = MCH) 22.6 pg      27.0-31.0                 



[place] 661861-09-20 16:29:00





             Test Item    Value        Reference Range Interpretation Comments

 

             MCHC (test code = MCHC) 31.0         32.0-36.0                 



[place] 661861-09-20 16:29:00





             Test Item    Value        Reference Range Interpretation Comments

 

             RDW (test code = RDW) 18.2         11.5-14.5                 



[place] 661861-09-20 16:29:00





             Test Item    Value        Reference Range Interpretation Comments

 

             Platelet (test code = Platelet) 588          133-450               

    



[facility] CenterWdstmdjAKSEPAISJQ6753-04-68 16:29:00





             Test Item    Value        Reference Range Interpretation Comments

 

             MPV (test code = MPV) 7.0          7.4-10.4                  



[place] 661861-09-20 16:29:00





             Test Item    Value        Reference Range Interpretation Comments

 

             PT (test code = PT) 13.3 s       12.0-14.7                 



[place] 661861-09-20 16:29:00





             Test Item    Value        Reference Range Interpretation Comments

 

             INR (test code = INR) 1.02 1       0.85-1.17                 



[place] 661861-09-20 16:29:00





             Test Item    Value        Reference Range Interpretation Comments

 

             PTT (test code = PTT) 29.7 s       22.9-35.8                 



[place] 661861-09-20 16:29:00





             Test Item    Value        Reference Range Interpretation Comments

 

             Segs (test code = Segs) 64.2         45.0-75.0                 



[facility] CenterAchvbxeMOOTQNDODA8284-22-40 16:29:00





             Test Item    Value        Reference Range Interpretation Comments

 

             Lymphocytes (test code = Lymphocytes) 27.8         20.0-40.0       

          



[facility] CenterPhuqucuPPBTQGNKRH1757-92-01 16:29:00





             Test Item    Value        Reference Range Interpretation Comments

 

             Monocytes (test code = Monocytes) 6.0          2.0-12.0            

      



[place] 661861-09-20 16:29:00





             Test Item    Value        Reference Range Interpretation Comments

 

             Eosinophils (test code = 1.2          See_Comment                [A

utomated message] The



             Eosinophils)                                        system which ge

nerated



                                                                 this result tra

nsmitted



                                                                 reference range

: <=4.0.



                                                                 The reference r

slim was



                                                                 not used to int

erpret



                                                                 this result as



                                                                 normal/abnormal

.



[place] 661861-09-20 16:29:00





             Test Item    Value        Reference Range Interpretation Comments

 

             Basophils (test code = 0.8          See_Comment                [Aut

omated message] The



             Basophils)                                          system which ge

nerated



                                                                 this result tra

nsmitted



                                                                 reference range

: <=1.0.



                                                                 The reference r

slim was



                                                                 not used to int

erpret



                                                                 this result as



                                                                 normal/abnormal

.



[facility] CenterRkmnlqoLEWBWOHYFE6647-39-57 16:29:00





             Test Item    Value        Reference Range Interpretation Comments

 

             Neutrophils # (test code = Neutrophils 3.5          1.5-8.1        

           



             #)                                                  



[facility] CenterBhzdtjuMGEZJAGNYJ3745-25-27 16:29:00





             Test Item    Value        Reference Range Interpretation Comments

 

             Lymphocytes # (test code = Lymphocytes 1.5          1.0-5.5        

           



             #)                                                  



[facility] CenterBqqiecdESGZHTRYRX6166-83-00 16:29:00





             Test Item    Value        Reference Range Interpretation Comments

 

             Monocytes # (test code 0.3          See_Comment                [Aut

omated message] The



             = Monocytes #)                                        system which 

generated



                                                                 this result tra

nsmitted



                                                                 reference range

: <=0.8.



                                                                 The reference r

slim was



                                                                 not used to int

erpret



                                                                 this result as



                                                                 normal/abnormal

.



[facility] CenterDjonhmyAAJQJCXSQF9612-66-85 16:29:00





             Test Item    Value        Reference Range Interpretation Comments

 

             Eosinophils # (test code 0.1          See_Comment                [A

utomated message] The



             = Eosinophils #)                                        system whic

h generated



                                                                 this result tra

nsmitted



                                                                 reference range

: <=0.5.



                                                                 The reference r

slim was



                                                                 not used to int

erpret



                                                                 this result as



                                                                 normal/abnormal

.



[facility] CenterAgmdlgxVQUYQCYDBT3940-66-83 16:29:00





             Test Item    Value        Reference Range Interpretation Comments

 

             Microcyte (test code = 1+ *ABN*(21                           



             Microcyte)   11:29 AM)                              



[facility]ERT[facility] 16[facility]29:00





             Test Item    Value        Reference Range Interpretation Comments

 

             Coronavirus (COVID-19) Not Detected                           



             APARNA (test code = *NA*(21 11:29                           



             Coronavirus (COVID-19) AM)                                    



             APARNA)                                                



[facility]2021-07-15 10:13:00





             Test Item    Value        Reference Range Interpretation Comments

 

             Glucose Lvl (test code = Glucose Lvl) 79           70-99           

          



[facility]2021-07-15 10:13:00





             Test Item    Value        Reference Range Interpretation Comments

 

             BUN (test code = BUN) 9            7-22                      



[facility]2021-07-15 10:13:00





             Test Item    Value        Reference Range Interpretation Comments

 

             Creatinine Lvl (test code = Creatinine 0.78         0.50-1.40      

           



             Lvl)                                                



[facility]2021-07-15 10:13:00





             Test Item    Value        Reference Range Interpretation Comments

 

             Sodium Lvl (test code = Sodium Lvl) 143          135-145           

        



[place] 410201-07-15 10:13:00





             Test Item    Value        Reference Range Interpretation Comments

 

             Potassium Lvl (test code = Potassium 3.3          3.5-5.1          

         



             Lvl)                                                



[place] 410201-07-15 10:13:00





             Test Item    Value        Reference Range Interpretation Comments

 

             Chloride Lvl (test code = Chloride Lvl) 111                  

            



[place] 410201-07-15 10:13:00





             Test Item    Value        Reference Range Interpretation Comments

 

             CO2 (test code = CO2) 21           24-32                     



[place] 410201-07-15 10:13:00





             Test Item    Value        Reference Range Interpretation Comments

 

             AGAP (test code = AGAP) 14.3         10.0-20.0                 



[place] 410201-07-15 10:13:00





             Test Item    Value        Reference Range Interpretation Comments

 

             Calcium Lvl (test code = Calcium Lvl) 6.5          8.5-10.5        

          



[place] 410201-07-15 10:13:00





             Test Item    Value        Reference Range Interpretation Comments

 

             eGFR (test code = eGFR) 80                                     



[id number]





             Test Item    Value        Reference Range Interpretation Comments

 

             WBC (test code = WBC) 3.8          3.7-10.4                  



[place] 661861-07-15 09:18:00





             Test Item    Value        Reference Range Interpretation Comments

 

             RBC (test code = RBC) 3.00         4.20-5.40                 



[place] 661861-07-15 09:18:00





             Test Item    Value        Reference Range Interpretation Comments

 

             Hgb (test code = Hgb) 8.2          12.0-16.0                 



[place] [address]-07-15 09:18:00





             Test Item    Value        Reference Range Interpretation Comments

 

             Hct (test code = Hct) 24.9         36.0-48.0                 



[place] [address]-07-15 09:18:00





             Test Item    Value        Reference Range Interpretation Comments

 

             MCV (test code = MCV) 83.1         80.0-98.0                 



[place] [address]-07-15 09:18:00





             Test Item    Value        Reference Range Interpretation Comments

 

             MCH (test code = MCH) 27.3 pg      27.0-31.0                 



[place] 661861-07-15 09:18:00





             Test Item    Value        Reference Range Interpretation Comments

 

             MCHC (test code = MCHC) 32.9         32.0-36.0                 



[place] 661861-07-15 09:18:00





             Test Item    Value        Reference Range Interpretation Comments

 

             RDW (test code = RDW) 14.7         11.5-14.5                 



[place] 661861-07-15 09:18:00





             Test Item    Value        Reference Range Interpretation Comments

 

             Platelet (test code = Platelet) 314          133-450               

    



[place] 661861-07-15 09:18:00





             Test Item    Value        Reference Range Interpretation Comments

 

             MPV (test code = MPV) 7.0          7.4-10.4                  



[place] 661861-07-15 09:18:00





             Test Item    Value        Reference Range Interpretation Comments

 

             Segs (test code = Segs) 50.0         45.0-75.0                 



[place] 661861-07-15 09:18:00





             Test Item    Value        Reference Range Interpretation Comments

 

             Lymphocytes (test code = Lymphocytes) 39.3         20.0-40.0       

          



[place] 661861-07-15 09:18:00





             Test Item    Value        Reference Range Interpretation Comments

 

             Monocytes (test code = Monocytes) 8.4          2.0-12.0            

      



[place] 661861-07-15 09:18:00





             Test Item    Value        Reference Range Interpretation Comments

 

             Eosinophils (test code = 1.7          See_Comment                [A

utomated message] The



             Eosinophils)                                        system which ge

nerated



                                                                 this result tra

nsmitted



                                                                 reference range

: <=4.0.



                                                                 The reference r

slim was



                                                                 not used to int

erpret



                                                                 this result as



                                                                 normal/abnormal

.



[place] 661861-07-15 09:18:00





             Test Item    Value        Reference Range Interpretation Comments

 

             Basophils (test code = 0.6          See_Comment                [Aut

omated message] The



             Basophils)                                          system which ge

nerated



                                                                 this result tra

nsmitted



                                                                 reference range

: <=1.0.



                                                                 The reference r

slim was



                                                                 not used to int

erpret



                                                                 this result as



                                                                 normal/abnormal

.



[place] 661861-07-15 09:18:00





             Test Item    Value        Reference Range Interpretation Comments

 

             Neutrophils # (test code = Neutrophils 1.9          1.5-8.1        

           



             #)                                                  



[place] 661861-07-15 09:18:00





             Test Item    Value        Reference Range Interpretation Comments

 

             Lymphocytes # (test code = Lymphocytes 1.5          1.0-5.5        

           



             #)                                                  



[place] 661861-07-15 09:18:00





             Test Item    Value        Reference Range Interpretation Comments

 

             Monocytes # (test code 0.3          See_Comment                [Aut

omated message] The



             = Monocytes #)                                        system which 

generated



                                                                 this result tra

nsmitted



                                                                 reference range

: <=0.8.



                                                                 The reference r

slim was



                                                                 not used to int

erpret



                                                                 this result as



                                                                 normal/abnormal

.



[place] [address]-07-15 09:18:00





             Test Item    Value        Reference Range Interpretation Comments

 

             Eosinophils # (test code 0.1          See_Comment                [A

utomated message] The



             = Eosinophils #)                                        system whic

h generated



                                                                 this result tra

nsmitted



                                                                 reference range

: <=0.5.



                                                                 The reference r

slim was



                                                                 not used to int

erpret



                                                                 this result as



                                                                 normal/abnormal

.



[place] 410201-07-14 09:25:00





             Test Item    Value        Reference Range Interpretation Comments

 

             Glucose Lvl (test code = Glucose Lvl) 107          70-99           

          



[place] 410201-07-14 09:25:00





             Test Item    Value        Reference Range Interpretation Comments

 

             BUN (test code = BUN) 14           7-22                      



[place] [address]-07-14 09:25:00





             Test Item    Value        Reference Range Interpretation Comments

 

             Creatinine Lvl (test code = Creatinine 0.84         0.50-1.40      

           



             Lvl)                                                



[place] [address]-07-14 09:25:00





             Test Item    Value        Reference Range Interpretation Comments

 

             Sodium Lvl (test code = Sodium Lvl) 141          135-145           

        



[place] 410201-07-14 09:25:00





             Test Item    Value        Reference Range Interpretation Comments

 

             Potassium Lvl (test code = Potassium 3.1          3.5-5.1          

         



             Lvl)                                                



[place] 410201-07-14 09:25:00





             Test Item    Value        Reference Range Interpretation Comments

 

             Chloride Lvl (test code = Chloride Lvl) 110                  

            



[place] [address]-07-14 09:25:00





             Test Item    Value        Reference Range Interpretation Comments

 

             CO2 (test code = CO2) 27           24-32                     



[place] [address]-07-14 09:25:00





             Test Item    Value        Reference Range Interpretation Comments

 

             AGAP (test code = AGAP) 7.1          10.0-20.0                 



[id number]





             Test Item    Value        Reference Range Interpretation Comments

 

             Calcium Lvl (test code = Calcium Lvl) 7.2          8.5-10.5        

          



[facility] [id number] 09:25:00





             Test Item    Value        Reference Range Interpretation Comments

 

             B/C Ratio (test code = B/C Ratio) 17 1         6-25                

      



[facility] [employer] [id number] 09:25:00





             Test Item    Value        Reference Range Interpretation Comments

 

             Total Protein (test code = Total 6.3          6.4-8.4              

     



             Protein)                                            



[facility] [id number] 09:25:00





             Test Item    Value        Reference Range Interpretation Comments

 

             Albumin Lvl (test code = Albumin Lvl) 2.7          3.5-5.0         

          



[facility] [employer] [id number] 09:25:00





             Test Item    Value        Reference Range Interpretation Comments

 

             Globulin (test code = Globulin) 3.6          2.7-4.2               

    



[facility] [employer] [id number] 09:25:00





             Test Item    Value        Reference Range Interpretation Comments

 

             A/G Ratio (test code = A/G Ratio) 0.8 1        0.7-1.6             

      



[facility] [employer] [id number] 09:25:00





             Test Item    Value        Reference Range Interpretation Comments

 

             ALT (test code = ALT) 18           See_Comment                [Auto

mated message] The



                                                                 system which ge

nerated this



                                                                 result transmit

maria luz



                                                                 reference range

: <=65. The



                                                                 reference range

 was not



                                                                 used to interpr

et this



                                                                 result as kirsten

l/abnormal.



[facility] [employer] [id number] 09:25:00





             Test Item    Value        Reference Range Interpretation Comments

 

             AST (test code = AST) 13           See_Comment                [Auto

mated message] The



                                                                 system which ge

nerated this



                                                                 result transmit

maria luz



                                                                 reference range

: <=37. The



                                                                 reference range

 was not



                                                                 used to interpr

et this



                                                                 result as kirsten

l/abnormal.



[facility] [employer] [id number] 09:25:00





             Test Item    Value        Reference Range Interpretation Comments

 

             Alk Phos (test code = Alk Phos) 108                          

    



[facility] [employer] [id number] 09:25:00





             Test Item    Value        Reference Range Interpretation Comments

 

             Bili Total (test code = Bili Total) 0.7          0.2-1.3           

        



[facility]2021-07-14 09:25:00





             Test Item    Value        Reference Range Interpretation Comments

 

             eGFR (test code = eGFR) 73                                     



[facility] Center[facility]ktfnmgCNXOXOPFEI2719-95-08 09:25:00





             Test Item    Value        Reference Range Interpretation Comments

 

             WBC (test code = WBC) 5.0          3.7-10.4                  



[facility] CenterMcqvpekJFKMYXGWOP5304-76-34 09:25:00





             Test Item    Value        Reference Range Interpretation Comments

 

             RBC (test code = RBC) 3.04         4.20-5.40                 



[facility] CenterSmxmtfyCLEZOKUHIV2654-66-11 09:25:00





             Test Item    Value        Reference Range Interpretation Comments

 

             Hgb (test code = Hgb) 8.2          12.0-16.0                 



[place] 661861-07-14 09:25:00





             Test Item    Value        Reference Range Interpretation Comments

 

             Hct (test code = Hct) 24.9         36.0-48.0                 



[place] 661861-07-14 09:25:00





             Test Item    Value        Reference Range Interpretation Comments

 

             MCV (test code = MCV) 82.1         80.0-98.0                 



[facility] CenterWiyugelHWEDLLBXNQ7049-14-73 09:25:00





             Test Item    Value        Reference Range Interpretation Comments

 

             MCH (test code = MCH) 27.1 pg      27.0-31.0                 



[facility] CenterEpcqtsoCVMSILHQKK0816-04-21 09:25:00





             Test Item    Value        Reference Range Interpretation Comments

 

             MCHC (test code = MCHC) 32.9         32.0-36.0                 



[facility] CenterMnienfjYZLIKGJDHT8314-12-24 09:25:00





             Test Item    Value        Reference Range Interpretation Comments

 

             RDW (test code = RDW) 14.8         11.5-14.5                 



[place] 661861-07-14 09:25:00





             Test Item    Value        Reference Range Interpretation Comments

 

             Platelet (test code = Platelet) 297          133-450               

    



[facility] CenterQzktofqIYENISJXVD5342-11-49 09:25:00





             Test Item    Value        Reference Range Interpretation Comments

 

             MPV (test code = MPV) 7.0          7.4-10.4                  



[facility] CenterSuhpkokPHLADYIFWZ1801-26-49 09:25:00





             Test Item    Value        Reference Range Interpretation Comments

 

             PT (test code = PT) 13.7 s       12.0-14.7                 



[facility] CenterRsjnzmcZWIHTBVTZP3110-91-25 09:25:00





             Test Item    Value        Reference Range Interpretation Comments

 

             INR (test code = INR) 1.06 1       0.85-1.17                 



[place] 661861-07-14 09:25:00





             Test Item    Value        Reference Range Interpretation Comments

 

             PTT (test code = PTT) 27.8 s       22.9-35.8                 



[place] 661861-07-14 09:25:00





             Test Item    Value        Reference Range Interpretation Comments

 

             Segs (test code = Segs) 56.8         45.0-75.0                 



[place] 661861-07-14 09:25:00





             Test Item    Value        Reference Range Interpretation Comments

 

             Lymphocytes (test code = Lymphocytes) 34.8         20.0-40.0       

          



[place] 661861-07-14 09:25:00





             Test Item    Value        Reference Range Interpretation Comments

 

             Monocytes (test code = Monocytes) 6.5          2.0-12.0            

      



[place] 661861-07-14 09:25:00





             Test Item    Value        Reference Range Interpretation Comments

 

             Eosinophils (test code = 1.4          See_Comment                [A

utomated message] The



             Eosinophils)                                        system which ge

nerated



                                                                 this result tra

nsmitted



                                                                 reference range

: <=4.0.



                                                                 The reference r

slim was



                                                                 not used to int

erpret



                                                                 this result as



                                                                 normal/abnormal

.



[facility] [id number] 09:25:00





             Test Item    Value        Reference Range Interpretation Comments

 

             Basophils (test code = 0.5          See_Comment                [Aut

omated message] The



             Basophils)                                          system which ge

nerated



                                                                 this result tra

nsmitted



                                                                 reference range

: <=1.0.



                                                                 The reference r

slim was



                                                                 not used to int

erpret



                                                                 this result as



                                                                 normal/abnormal

.



[facility] CenterEatdzgqLJVFLGYQIF1760-79-38 09:25:00





             Test Item    Value        Reference Range Interpretation Comments

 

             Neutrophils # (test code = Neutrophils 2.8          1.5-8.1        

           



             #)                                                  



[place] 661861-07-14 09:25:00





             Test Item    Value        Reference Range Interpretation Comments

 

             Lymphocytes # (test code = Lymphocytes 1.7          1.0-5.5        

           



             #)                                                  



[place] 661861-07-14 09:25:00





             Test Item    Value        Reference Range Interpretation Comments

 

             Monocytes # (test code 0.3          See_Comment                [Aut

omated message] The



             = Monocytes #)                                        system which 

generated



                                                                 this result tra

nsmitted



                                                                 reference range

: <=0.8.



                                                                 The reference r

slim was



                                                                 not used to int

erpret



                                                                 this result as



                                                                 normal/abnormal

.



[facility] QrketkrSHFGAGTALS1612-02-54 09:25:00





             Test Item    Value        Reference Range Interpretation Comments

 

             Eosinophils # (test code 0.1          See_Comment                [A

utomated message] The



             = Eosinophils #)                                        system whic

h generated



                                                                 this result tra

nsmitted



                                                                 reference range

: <=0.5.



                                                                 The reference r

slim was



                                                                 not used to int

erpret



                                                                 this result as



                                                                 normal/abnormal

.



[facility] [employer] [id number] 02:07:00





             Test Item    Value        Reference Range Interpretation Comments

 

             RBC product (test code Product available                           



             = RBC product) 4(21 9:07 PM)                           



[facility] TyffuruLBRUSIJYOO0924-19-58 21:49:00





             Test Item    Value        Reference Range Interpretation Comments

 

             Coronavirus (COVID-19) Not Detected (21                       

    



             APARNA (test code = 4:49 PM)                               



             Coronavirus (COVID-19)                                        



             APARNA)                                                



[facility] [employer] [id number] 20:55:00





             Test Item    Value        Reference Range Interpretation Comments

 

             RBC product (test code Product available                           



             = RBC product) 5(21 3:55 PM)                           



[facility] [employer] [id number] 18:06:00





             Test Item    Value        Reference Range Interpretation Comments

 

             ABO/Rh (test code = ABO/Rh) O POS                                  



[employer] [id number] 18:06:00





             Test Item    Value        Reference Range Interpretation Comments

 

             Antibody Scrn (test Negative (21 1:06                         

  



             code = Antibody Scrn) PM)                                    



[employer]2021-07-13 18:06:00





             Test Item    Value        Reference Range Interpretation Comments

 

             Glucose Lvl (test code = Glucose Lvl) 106          70-99           

          



[facility] [employer] [id number] 18:06:00





             Test Item    Value        Reference Range Interpretation Comments

 

             BUN (test code = BUN) 19           7-22                      



[employer]2021-07-13 18:06:00





             Test Item    Value        Reference Range Interpretation Comments

 

             Creatinine Lvl (test code = Creatinine 0.89         0.50-1.40      

           



             Lvl)                                                



[employer]2021-07-13 18:06:00





             Test Item    Value        Reference Range Interpretation Comments

 

             Sodium Lvl (test code = Sodium Lvl) 140          135-145           

        



[facility] Lat492021-07-13 18:06:00





             Test Item    Value        Reference Range Interpretation Comments

 

             Potassium Lvl (test code = Potassium 3.7          3.5-5.1          

         



             Lvl)                                                



[place] 410201-07-13 18:06:00





             Test Item    Value        Reference Range Interpretation Comments

 

             Chloride Lvl (test code = Chloride Lvl) 108                  

            



[place] [id number] 18:06:00





             Test Item    Value        Reference Range Interpretation Comments

 

             CO2 (test code = CO2) 30           24-32                     



[place] [id number] 18:06:00





             Test Item    Value        Reference Range Interpretation Comments

 

             Calcium Lvl (test code = Calcium Lvl) 7.9          8.5-10.5        

          



[place] [id number] 18:06:00





             Test Item    Value        Reference Range Interpretation Comments

 

             Total Protein (test code = Total 6.9          6.4-8.4              

     



             Protein)                                            



[place] [id number] 18:06:00





             Test Item    Value        Reference Range Interpretation Comments

 

             Albumin Lvl (test code = Albumin Lvl) 3.1          3.5-5.0         

          



[facility] [id number] 18:06:00





             Test Item    Value        Reference Range Interpretation Comments

 

             ALT (test code = ALT) 18           See_Comment                [Auto

mated message] The



                                                                 system which ge

nerated this



                                                                 result transmit

maria luz



                                                                 reference range

: <=65. The



                                                                 reference range

 was not



                                                                 used to interpr

et this



                                                                 result as kirsten

l/abnormal.



[place] [id number] 18:06:00





             Test Item    Value        Reference Range Interpretation Comments

 

             AST (test code = AST) 14           See_Comment                [Auto

mated message] The



                                                                 system which ge

nerated this



                                                                 result transmit

maria luz



                                                                 reference range

: <=37. The



                                                                 reference range

 was not



                                                                 used to interpr

et this



                                                                 result as kirsten

l/abnormal.



[place] [id number] 18:06:00





             Test Item    Value        Reference Range Interpretation Comments

 

             Alk Phos (test code = Alk Phos) 115                          

    



[facility] [id number] 18:06:00





             Test Item    Value        Reference Range Interpretation Comments

 

             Bili Total (test code = Bili Total) 0.2          0.2-1.3           

        



[place] [id number] 18:06:00





             Test Item    Value        Reference Range Interpretation Comments

 

             AGAP (test code = AGAP) 5.7          10.0-20.0                 



[place] [id number] 18:06:00





             Test Item    Value        Reference Range Interpretation Comments

 

             B/C Ratio (test code = B/C Ratio) 21 1         6-25                

      



[place] [id number] 18:06:00





             Test Item    Value        Reference Range Interpretation Comments

 

             Globulin (test code = Globulin) 3.8          2.7-4.2               

    



[place] [id number] 18:06:00





             Test Item    Value        Reference Range Interpretation Comments

 

             A/G Ratio (test code = A/G Ratio) 0.8 1        0.7-1.6             

      



[place] 410201-07-13 18:06:00





             Test Item    Value        Reference Range Interpretation Comments

 

             eGFR (test code = eGFR) 68                                     



[place] [id number] 18:06:00





             Test Item    Value        Reference Range Interpretation Comments

 

             Lipase Lvl (test code = Lipase Lvl) 119                      

        



[place] [id number] 18:06:00





             Test Item    Value        Reference Range Interpretation Comments

 

             WBC (test code = WBC) 6.9          3.7-10.4                  



[place] [id number] 18:06:00





             Test Item    Value        Reference Range Interpretation Comments

 

             RBC (test code = RBC) 2.58         4.20-5.40                 



[place] [id number] 18:06:00





             Test Item    Value        Reference Range Interpretation Comments

 

             Hgb (test code = Hgb) 6.8          12.0-16.0                 



[place] [id number] 18:06:00





             Test Item    Value        Reference Range Interpretation Comments

 

             Hct (test code = Hct) 21.2         36.0-48.0                 



[place] 54[address]-07-13 18:06:00





             Test Item    Value        Reference Range Interpretation Comments

 

             MCV (test code = MCV) 82.2         80.0-98.0                 



[place] [id number] 18:06:00





             Test Item    Value        Reference Range Interpretation Comments

 

             MCH (test code = MCH) 26.3 pg      27.0-31.0                 



[place] [id number] 18:06:00





             Test Item    Value        Reference Range Interpretation Comments

 

             MCHC (test code = MCHC) 32.0         32.0-36.0                 



[place] [id number] 18:06:00





             Test Item    Value        Reference Range Interpretation Comments

 

             RDW (test code = RDW) 15.3         11.5-14.5                 



[place] 661861-07-13 18:06:00





             Test Item    Value        Reference Range Interpretation Comments

 

             Platelet (test code = Platelet) 393          133-450               

    



[place] [id number] 18:06:00





             Test Item    Value        Reference Range Interpretation Comments

 

             MPV (test code = MPV) 6.8          7.4-10.4                  



[place] 661861-07-13 18:06:00





             Test Item    Value        Reference Range Interpretation Comments

 

             PT (test code = PT) 13.3 s       12.0-14.7                 



[place] 661861-07-13 18:06:00





             Test Item    Value        Reference Range Interpretation Comments

 

             INR (test code = INR) 1.02 1       0.85-1.17                 



[place] [id number] 18:06:00





             Test Item    Value        Reference Range Interpretation Comments

 

             PTT (test code = PTT) 28.9 s       22.9-35.8                 



[place] [id number] 18:06:00





             Test Item    Value        Reference Range Interpretation Comments

 

             Segs (test code = Segs) 63.4         45.0-75.0                 



[place] [id number] 18:06:00





             Test Item    Value        Reference Range Interpretation Comments

 

             Lymphocytes (test code = Lymphocytes) 29.7         20.0-40.0       

          



[place] [address]-07-13 18:06:00





             Test Item    Value        Reference Range Interpretation Comments

 

             Monocytes (test code = Monocytes) 5.7          2.0-12.0            

      



[place] [address]-07-13 18:06:00





             Test Item    Value        Reference Range Interpretation Comments

 

             Eosinophils (test code = 0.5          See_Comment                [A

utomated message] The



             Eosinophils)                                        system which ge

nerated



                                                                 this result tra

nsmitted



                                                                 reference range

: <=4.0.



                                                                 The reference r

slim was



                                                                 not used to int

erpret



                                                                 this result as



                                                                 normal/abnormal

.



[place] 54[address]-07-13 18:06:00





             Test Item    Value        Reference Range Interpretation Comments

 

             Basophils (test code = 0.7          See_Comment                [Aut

omated message] The



             Basophils)                                          system which ge

nerated



                                                                 this result tra

nsmitted



                                                                 reference range

: <=1.0.



                                                                 The reference r

slim was



                                                                 not used to int

erpret



                                                                 this result as



                                                                 normal/abnormal

.



[id number]





             Test Item    Value        Reference Range Interpretation Comments

 

             Neutrophils # (test code = Neutrophils 4.4          1.5-8.1        

           



             #)                                                  



[id number]:00





             Test Item    Value        Reference Range Interpretation Comments

 

             Lymphocytes # (test code = Lymphocytes 2.0          1.0-5.5        

           



             #)                                                  



[id number] [facility]:06:00





             Test Item    Value        Reference Range Interpretation Comments

 

             Monocytes # (test code 0.4          See_Comment                [Aut

omated message] The



             = Monocytes #)                                        system which 

generated



                                                                 this result tra

nsmitted



                                                                 reference range

: <=0.8.



                                                                 The reference r

slim was



                                                                 not used to int

erpret



                                                                 this result as



                                                                 normal/abnormal

.



[facility] AND [id number] 18:06:00





             Test Item    Value        Reference Range Interpretation Comments

 

             UA Color (test code = Light Yellow                           



             UA Color)    *NA*(21 1:06 PM)                           



[facility] AND [id number] 18:06:00





             Test Item    Value        Reference Range Interpretation Comments

 

             UA Turbidity (test code = Clear (21 1:06                      

     



             UA Turbidity) PM)                                    



[facility] AND [id number] 18:06:00





             Test Item    Value        Reference Range Interpretation Comments

 

             UA Spec Grav (test code = UA Spec 1.015 1                          

      



             Grav)                                               



[facility] AND [id number] 18:06:00





             Test Item    Value        Reference Range Interpretation Comments

 

             UA pH (test code = UA pH) 6.0 1        5.0-8.0                   



[facility] AND [id number] 18:06:00





             Test Item    Value        Reference Range Interpretation Comments

 

             UA Protein (test code = UA Negative mg/dL                          

 



             Protein)                                            



[facility] AND [id number] 18:06:00





             Test Item    Value        Reference Range Interpretation Comments

 

             UA Glucose (test code = UA Negative mg/dL                          

 



             Glucose)                                            



[facility] AND [id number] 18:06:00





             Test Item    Value        Reference Range Interpretation Comments

 

             UA Bili (test code = Negative *NA*(21                         

  



             UA Bili)     1:06 PM)                               



[facility] AND [id number] 18:06:00





             Test Item    Value        Reference Range Interpretation Comments

 

             UA Blood (test code = Negative (21 1:06                       

    



             UA Blood)    PM)                                    



[facility] AND [id number] 18:06:00





             Test Item    Value        Reference Range Interpretation Comments

 

             UA Nitrite (test code Negative (21 1:06                       

    



             = UA Nitrite) PM)                                    



[facility] AND [id number] 18:06:00





             Test Item    Value        Reference Range Interpretation Comments

 

             UA Leuk Est (test Negative (21 1:06                           



             code = UA Leuk Est) PM)                                    



[facility] AND [id number] 18:06:00





             Test Item    Value        Reference Range Interpretation Comments

 

             UA Sq Epi (test code = UA Sq Occasional /LPF                       

    



             Epi)                                                



[facility] AND [id number] 18:06:00





             Test Item    Value        Reference Range Interpretation Comments

 

             UA WBC (test code = 1            See_Comment                [Automa

maria luz message] The



             UA WBC)                                             system which ge

nerated this



                                                                 result transmit

maria luz



                                                                 reference range

: <=5. The



                                                                 reference range

 was not



                                                                 used to interpr

et this



                                                                 result as kirsten

l/abnormal.



[facility] AND [id number] 18:06:00





             Test Item    Value        Reference Range Interpretation Comments

 

             UA Bacteria (test code = UA Occasional /HPF                        

   



             Bacteria)                                           



[facility] AND [id number] 18:06:00





             Test Item    Value        Reference Range Interpretation Comments

 

             UA Mucus (test code = UA Mucus) Few /LPF                           

    



[facility] AND [id number] 18:06:00





             Test Item    Value        Reference Range Interpretation Comments

 

             UA Ketones (test code = UA Ketones) Negative                       

        



[facility] AND [id number] 18:06:00





             Test Item    Value        Reference Range Interpretation Comments

 

             UA Urobilinogen (test code = UA <=1.0 mg/dL  0.1-1.0               

    



             Urobilinogen)                                        



[facility]; 2 viewsCLINICAL INDICATION: R05 
CoughTECHNIQUE: PA and lateral views of the chest.FINDINGS: Comparison study: No
previous examination.The lungs are clear. There are no infiltrates or 
effusions.The cardiac silhouette is unremarkable. The douglas and mediastinum are 
intact.The regional skeleton is intact.IMPRESSION:No active process.